# Patient Record
Sex: FEMALE | Race: WHITE | NOT HISPANIC OR LATINO | Employment: UNEMPLOYED | URBAN - METROPOLITAN AREA
[De-identification: names, ages, dates, MRNs, and addresses within clinical notes are randomized per-mention and may not be internally consistent; named-entity substitution may affect disease eponyms.]

---

## 2021-08-27 ENCOUNTER — TELEPHONE (OUTPATIENT)
Dept: OBSTETRICS AND GYNECOLOGY | Facility: CLINIC | Age: 21
End: 2021-08-27

## 2021-08-27 NOTE — TELEPHONE ENCOUNTER
Called [facility] and informed  1/15/20.   Dre Rojas,  Potential patient calling. Has just moved to the area. Is currently 37 weeks pregnant with an SHABANA of 9/14/21. Patient stated that she would be able to have all prenatal records faxed to us. Would you be willing to take the remainder of her prenatal care?  Thank you,  Shantell

## 2021-08-31 ENCOUNTER — INITIAL PRENATAL (OUTPATIENT)
Dept: OBSTETRICS AND GYNECOLOGY | Facility: CLINIC | Age: 21
End: 2021-08-31

## 2021-08-31 ENCOUNTER — TELEPHONE (OUTPATIENT)
Dept: OBSTETRICS AND GYNECOLOGY | Facility: CLINIC | Age: 21
End: 2021-08-31

## 2021-08-31 VITALS
BODY MASS INDEX: 24.43 KG/M2 | WEIGHT: 152 LBS | HEIGHT: 66 IN | DIASTOLIC BLOOD PRESSURE: 69 MMHG | SYSTOLIC BLOOD PRESSURE: 100 MMHG

## 2021-08-31 DIAGNOSIS — Z34.03 ENCOUNTER FOR SUPERVISION OF NORMAL FIRST PREGNANCY IN THIRD TRIMESTER: Primary | ICD-10-CM

## 2021-08-31 LAB
EXTERNAL ABO GROUPING: NORMAL
EXTERNAL ANTIBODY SCREEN: NORMAL
EXTERNAL CYSTIC FIBROSIS: NEGATIVE
EXTERNAL HEMATOCRIT: 36 %
EXTERNAL HEMOGLOBIN: 12.4 G/DL
EXTERNAL HEPATITIS B SURFACE ANTIGEN: NEGATIVE
EXTERNAL RH FACTOR: POSITIVE
EXTERNAL SYPHILIS RPR SCREEN: NORMAL
GLUCOSE 1H P 100 G GLC PO SERPL-MCNC: 135 MG/DL
GLUCOSE UR STRIP-MCNC: NEGATIVE MG/DL
GP B STREP RRNA SPEC QL PROBE: NEGATIVE
HIV 1+2 AB+HIV1 P24 AG SERPL QL IA: NORMAL
PROT UR STRIP-MCNC: ABNORMAL MG/DL
RUBV IGG SERPL IA-ACNC: NORMAL

## 2021-08-31 PROCEDURE — 99203 OFFICE O/P NEW LOW 30 MIN: CPT | Performed by: OBSTETRICS & GYNECOLOGY

## 2021-08-31 RX ORDER — SWAB
SWAB, NON-MEDICATED MISCELLANEOUS
COMMUNITY
End: 2022-09-02

## 2021-08-31 NOTE — PROGRESS NOTES
Initial ob visit     CC- Here for care of pregnancy     Harleen Purdy is being seen today for her first obstetrical visit.  She is a 21 y.o.    38w0d gestation.     # 1 - Date: None, Sex: None, Weight: None, GA: None, Delivery: None, Apgar1: None, Apgar5: None, Living: None, Birth Comments: None    Transfer of care from [facility] at 37 weeks  Good initial prenatal care with no issues or concerns     Current obstetric complaints : none   Duration/severity of complaints:   Initial positive test date :  2020    Location : @ home    Prior obstetric issues, potential pregnancy concerns: transfer of care  Family history of genetic issues (includes FOB): none  Prior infections concerning in pregnancy (Rash, fever in last 2 weeks): none  Varicella Hx -negative history  Prior testing for Cystic Fibrosis Carrier or Sickle Cell Trait- negative   Prepregnancy BMI - Body mass index is 24.53 kg/m².  Hx of HSV for patient or partner : No     History reviewed. No pertinent past medical history.    Past Surgical History:   Procedure Laterality Date   • LEG SURGERY           Current Outpatient Medications:   •  Prenatal 28-0.8 MG tablet, Take  by mouth., Disp: , Rfl:     No Known Allergies    Social History     Socioeconomic History   • Marital status: Single     Spouse name: Not on file   • Number of children: Not on file   • Years of education: Not on file   • Highest education level: Not on file   Tobacco Use   • Smoking status: Former Smoker     Types: Cigarettes   • Smokeless tobacco: Never Used   Substance and Sexual Activity   • Alcohol use: Not Currently   • Drug use: Never   • Sexual activity: Yes     Partners: Male       Family History   Problem Relation Age of Onset   • No Known Problems Father    • No Known Problems Mother    • Breast cancer Neg Hx    • Ovarian cancer Neg Hx    • Uterine cancer Neg Hx    • Colon cancer Neg Hx    • Deep vein thrombosis Neg Hx    • Pulmonary embolism Neg Hx        Review  "of systems     Constitutional : Nausea, fatigue no nausea, fatigue none    : Vaginal bleeding, cramping no bleeding, No cramping   Breast Tenderness : none  All other systems reviewed and negative        Objective    /69   Ht 167.6 cm (66\")   Wt 68.9 kg (152 lb)   LMP 12/08/2020 (Exact Date)   BMI 24.53 kg/m²       General Appearance:    Alert, cooperative, in no acute distress, habitus normal    Head:    Normocephalic, without obvious abnormality, atraumatic   Eyes:            Lids and lashes normal, conjunctivae and sclerae normal, no   icterus, no pallor, corneas clear   Ears:    Ears appear intact with no abnormalities noted       Neck:   No adenopathy, supple, trachea midline, no thyromegaly   Back:     No kyphosis present, no scoliosis present,                       Lungs:     Clear to auscultation,respirations regular, even and     unlabored    Heart:    Regular rhythm and normal rate, normal S1 and S2, no            murmur, no gallop, no rub, no click   Breast Exam:    Deferred    Abdomen:     Normal bowel sounds, no masses, no organomegaly, soft        non-tender, non-distended, no guarding, no rebound                 tenderness   Genitalia:    Vulva - BUS-WNL, NEFG    Vagina - No discharge, No bleeding    Cervix - No Lesions, closed     Uterus - Consistent with 33 weeks, +FHTs    Adnexa - No mass, NT    Pelvimetry - clinically adequate, gynecoid pelvis     Extremities:   Moves all extremities well, no edema, no cyanosis, no              redness   Pulses:   Pulses palpable and equal bilaterally   Skin:   No bleeding, bruising or rash   Lymph nodes:   No palpable adenopathy   Neurologic:   Sensation intact, A&O times 3      Assessment & Plan     There are no diagnoses linked to this encounter.    1) Pregnancy at 38w0d  Records reviewed- have full records including labs, ultrasounds and visits.   Appears to be largely uncomplicated care   Infant AGA on recent ultrasound, has done all milestone " testing including, GBS negative   2) transfer of care  Moved to [facility].     Reviewed labor warnings and FMC at term           Activity recommendation : 150 minutes/week of moderate intensity aerobic activity unless we limit for bleeding, hypertension or other pregnancy complication   Patient is on Prenatal vitamins  Problem list reviewed and updated.  Reviewed routine prenatal care with the office to include but not limited to   Zika (travel restrictions/ok to use insect repellant), not to changing cat litter, food restrictions, avoidance of alcohol, tobacco and drugs and saunas/hot tubs.   All questions answered.     Ray Rojas MD   8/31/2021  10:14 EDT

## 2021-08-31 NOTE — TELEPHONE ENCOUNTER
Bailey,     Records for 38 week new ob today (moved from outside area). Good care.     Thanks,   Dr. Rojas

## 2021-09-07 ENCOUNTER — ROUTINE PRENATAL (OUTPATIENT)
Dept: OBSTETRICS AND GYNECOLOGY | Facility: CLINIC | Age: 21
End: 2021-09-07

## 2021-09-07 VITALS — DIASTOLIC BLOOD PRESSURE: 72 MMHG | SYSTOLIC BLOOD PRESSURE: 106 MMHG | WEIGHT: 154 LBS | BODY MASS INDEX: 24.86 KG/M2

## 2021-09-07 DIAGNOSIS — Z34.03 ENCOUNTER FOR SUPERVISION OF NORMAL FIRST PREGNANCY IN THIRD TRIMESTER: Primary | ICD-10-CM

## 2021-09-07 LAB
GLUCOSE UR STRIP-MCNC: NEGATIVE MG/DL
PROT UR STRIP-MCNC: ABNORMAL MG/DL

## 2021-09-07 PROCEDURE — 99212 OFFICE O/P EST SF 10 MIN: CPT | Performed by: OBSTETRICS & GYNECOLOGY

## 2021-09-07 NOTE — PROGRESS NOTES
Ob follow up    Harleen Purdy is a 21 y.o.  39w0d patient being seen today for her obstetrical visit. Patient reports no complaints. Fetal movement: normal.    Her prenatal care is complicated by (and status) : uncomplicated       ROS -   Fetal Movement good   Vaginal bleeding none   Cramping/Contractions intermittent none      /72   Wt 69.9 kg (154 lb)   LMP 2020 (Exact Date)   BMI 24.86 kg/m²     FHT:  144 BPM    Uterine Size: 32 cm   Presentations: cephalic   Pelvic Exam:     Dilation: 1cm    Effacement: 50%    Station:  -1                 Assessment    Diagnoses and all orders for this visit:    1. Encounter for supervision of normal first pregnancy in third trimester (Primary)        1) Pregnancy at 39w0d  2) Fetal status reassuring   3) GBS status - negative     Declines induction for now.   BPP next visit as will be at due date     Plan    Labor warnings   [facility] BID        Ray Rojas MD   2021  09:38 EDT

## 2021-09-09 ENCOUNTER — HOSPITAL ENCOUNTER (EMERGENCY)
Facility: HOSPITAL | Age: 21
End: 2021-09-09

## 2021-09-09 ENCOUNTER — HOSPITAL ENCOUNTER (INPATIENT)
Facility: HOSPITAL | Age: 21
LOS: 2 days | Discharge: HOME | End: 2021-09-12
Attending: OBSTETRICS & GYNECOLOGY | Admitting: OBSTETRICS & GYNECOLOGY

## 2021-09-09 PROCEDURE — 99202 OFFICE O/P NEW SF 15 MIN: CPT | Performed by: OBSTETRICS & GYNECOLOGY

## 2021-09-10 ENCOUNTER — ANESTHESIA EVENT (OUTPATIENT)
Dept: LABOR AND DELIVERY | Facility: HOSPITAL | Age: 21
End: 2021-09-10

## 2021-09-10 ENCOUNTER — ANESTHESIA (OUTPATIENT)
Dept: LABOR AND DELIVERY | Facility: HOSPITAL | Age: 21
End: 2021-09-10

## 2021-09-10 PROBLEM — Z34.90 PREGNANCY: Status: ACTIVE | Noted: 2021-09-10

## 2021-09-10 LAB
ABO GROUP BLD: NORMAL
AMORPH URATE CRY URNS QL MICRO: ABNORMAL /HPF
BACTERIA UR QL AUTO: ABNORMAL /HPF
BILIRUB UR QL STRIP: NEGATIVE
BLD GP AB SCN SERPL QL: NEGATIVE
CLARITY UR: ABNORMAL
COLOR UR: YELLOW
DEPRECATED RDW RBC AUTO: 40.9 FL (ref 37–54)
ERYTHROCYTE [DISTWIDTH] IN BLOOD BY AUTOMATED COUNT: 13.1 % (ref 12.3–15.4)
GLUCOSE UR STRIP-MCNC: NEGATIVE MG/DL
HCT VFR BLD AUTO: 33 % (ref 34–46.6)
HGB BLD-MCNC: 11.2 G/DL (ref 12–15.9)
HGB UR QL STRIP.AUTO: ABNORMAL
HYALINE CASTS UR QL AUTO: ABNORMAL /LPF
KETONES UR QL STRIP: NEGATIVE
LEUKOCYTE ESTERASE UR QL STRIP.AUTO: ABNORMAL
MCH RBC QN AUTO: 29.1 PG (ref 26.6–33)
MCHC RBC AUTO-ENTMCNC: 33.9 G/DL (ref 31.5–35.7)
MCV RBC AUTO: 85.7 FL (ref 79–97)
NITRITE UR QL STRIP: NEGATIVE
PH UR STRIP.AUTO: 7 [PH] (ref 5–8)
PLATELET # BLD AUTO: 229 10*3/MM3 (ref 140–450)
PMV BLD AUTO: 10.3 FL (ref 6–12)
PROT UR QL STRIP: ABNORMAL
RBC # BLD AUTO: 3.85 10*6/MM3 (ref 3.77–5.28)
RBC # UR: ABNORMAL /HPF
REF LAB TEST METHOD: ABNORMAL
RH BLD: POSITIVE
SARS-COV-2 RNA RESP QL NAA+PROBE: NOT DETECTED
SP GR UR STRIP: 1.01 (ref 1–1.03)
SQUAMOUS #/AREA URNS HPF: ABNORMAL /HPF
T&S EXPIRATION DATE: NORMAL
UROBILINOGEN UR QL STRIP: ABNORMAL
WBC # BLD AUTO: 12.06 10*3/MM3 (ref 3.4–10.8)
WBC UR QL AUTO: ABNORMAL /HPF

## 2021-09-10 PROCEDURE — 25010000002 FENTANYL CITRATE (PF) 2500 MCG/50ML SOLUTION: Performed by: ANESTHESIOLOGY

## 2021-09-10 PROCEDURE — C1755 CATHETER, INTRASPINAL: HCPCS

## 2021-09-10 PROCEDURE — 25010000002 ROPIVACAINE PER 1 MG: Performed by: ANESTHESIOLOGY

## 2021-09-10 PROCEDURE — C1755 CATHETER, INTRASPINAL: HCPCS | Performed by: ANESTHESIOLOGY

## 2021-09-10 PROCEDURE — 85027 COMPLETE CBC AUTOMATED: CPT | Performed by: OBSTETRICS & GYNECOLOGY

## 2021-09-10 PROCEDURE — U0003 INFECTIOUS AGENT DETECTION BY NUCLEIC ACID (DNA OR RNA); SEVERE ACUTE RESPIRATORY SYNDROME CORONAVIRUS 2 (SARS-COV-2) (CORONAVIRUS DISEASE [COVID-19]), AMPLIFIED PROBE TECHNIQUE, MAKING USE OF HIGH THROUGHPUT TECHNOLOGIES AS DESCRIBED BY CMS-2020-01-R: HCPCS | Performed by: OBSTETRICS & GYNECOLOGY

## 2021-09-10 PROCEDURE — 87086 URINE CULTURE/COLONY COUNT: CPT | Performed by: OBSTETRICS & GYNECOLOGY

## 2021-09-10 PROCEDURE — 86850 RBC ANTIBODY SCREEN: CPT | Performed by: OBSTETRICS & GYNECOLOGY

## 2021-09-10 PROCEDURE — 81001 URINALYSIS AUTO W/SCOPE: CPT | Performed by: OBSTETRICS & GYNECOLOGY

## 2021-09-10 PROCEDURE — 86901 BLOOD TYPING SEROLOGIC RH(D): CPT | Performed by: OBSTETRICS & GYNECOLOGY

## 2021-09-10 PROCEDURE — 86900 BLOOD TYPING SEROLOGIC ABO: CPT | Performed by: OBSTETRICS & GYNECOLOGY

## 2021-09-10 PROCEDURE — 0HQ9XZZ REPAIR PERINEUM SKIN, EXTERNAL APPROACH: ICD-10-PCS | Performed by: OBSTETRICS & GYNECOLOGY

## 2021-09-10 PROCEDURE — 25010000002 TERBUTALINE PER 1 MG: Performed by: OBSTETRICS & GYNECOLOGY

## 2021-09-10 PROCEDURE — 59410 OBSTETRICAL CARE: CPT | Performed by: OBSTETRICS & GYNECOLOGY

## 2021-09-10 RX ORDER — FAMOTIDINE 10 MG/ML
20 INJECTION, SOLUTION INTRAVENOUS ONCE AS NEEDED
Status: DISCONTINUED | OUTPATIENT
Start: 2021-09-10 | End: 2021-09-10 | Stop reason: HOSPADM

## 2021-09-10 RX ORDER — TERBUTALINE SULFATE 1 MG/ML
0.25 INJECTION, SOLUTION SUBCUTANEOUS ONCE
Status: COMPLETED | OUTPATIENT
Start: 2021-09-10 | End: 2021-09-10

## 2021-09-10 RX ORDER — ACETAMINOPHEN 325 MG/1
650 TABLET ORAL EVERY 4 HOURS PRN
Status: DISCONTINUED | OUTPATIENT
Start: 2021-09-10 | End: 2021-09-10 | Stop reason: HOSPADM

## 2021-09-10 RX ORDER — ONDANSETRON 2 MG/ML
4 INJECTION INTRAMUSCULAR; INTRAVENOUS EVERY 6 HOURS PRN
Status: DISCONTINUED | OUTPATIENT
Start: 2021-09-10 | End: 2021-09-10 | Stop reason: HOSPADM

## 2021-09-10 RX ORDER — ONDANSETRON 2 MG/ML
4 INJECTION INTRAMUSCULAR; INTRAVENOUS ONCE AS NEEDED
Status: DISCONTINUED | OUTPATIENT
Start: 2021-09-10 | End: 2021-09-10 | Stop reason: HOSPADM

## 2021-09-10 RX ORDER — MAGNESIUM CARB/ALUMINUM HYDROX 105-160MG
30 TABLET,CHEWABLE ORAL ONCE
Status: DISCONTINUED | OUTPATIENT
Start: 2021-09-10 | End: 2021-09-10 | Stop reason: HOSPADM

## 2021-09-10 RX ORDER — IBUPROFEN 800 MG/1
800 TABLET ORAL EVERY 8 HOURS PRN
Status: DISCONTINUED | OUTPATIENT
Start: 2021-09-10 | End: 2021-09-12 | Stop reason: HOSPADM

## 2021-09-10 RX ORDER — MISOPROSTOL 200 UG/1
800 TABLET ORAL AS NEEDED
Status: DISCONTINUED | OUTPATIENT
Start: 2021-09-10 | End: 2021-09-12 | Stop reason: HOSPADM

## 2021-09-10 RX ORDER — EPHEDRINE SULFATE 50 MG/ML
5 INJECTION, SOLUTION INTRAVENOUS AS NEEDED
Status: DISCONTINUED | OUTPATIENT
Start: 2021-09-10 | End: 2021-09-10 | Stop reason: HOSPADM

## 2021-09-10 RX ORDER — CARBOPROST TROMETHAMINE 250 UG/ML
250 INJECTION, SOLUTION INTRAMUSCULAR
Status: DISCONTINUED | OUTPATIENT
Start: 2021-09-10 | End: 2021-09-10 | Stop reason: HOSPADM

## 2021-09-10 RX ORDER — FAMOTIDINE 20 MG/1
20 TABLET, FILM COATED ORAL EVERY 12 HOURS SCHEDULED
Status: DISCONTINUED | OUTPATIENT
Start: 2021-09-10 | End: 2021-09-10 | Stop reason: HOSPADM

## 2021-09-10 RX ORDER — SODIUM CHLORIDE 0.9 % (FLUSH) 0.9 %
10 SYRINGE (ML) INJECTION AS NEEDED
Status: DISCONTINUED | OUTPATIENT
Start: 2021-09-10 | End: 2021-09-10 | Stop reason: HOSPADM

## 2021-09-10 RX ORDER — OXYTOCIN-SODIUM CHLORIDE 0.9% IV SOLN 30 UNIT/500ML 30-0.9/5 UT/ML-%
250 SOLUTION INTRAVENOUS CONTINUOUS PRN
Status: ACTIVE | OUTPATIENT
Start: 2021-09-10 | End: 2021-09-10

## 2021-09-10 RX ORDER — SODIUM CHLORIDE 0.9 % (FLUSH) 0.9 %
1-10 SYRINGE (ML) INJECTION AS NEEDED
Status: DISCONTINUED | OUTPATIENT
Start: 2021-09-10 | End: 2021-09-12 | Stop reason: HOSPADM

## 2021-09-10 RX ORDER — PHYTONADIONE 1 MG/.5ML
INJECTION, EMULSION INTRAMUSCULAR; INTRAVENOUS; SUBCUTANEOUS
Status: ACTIVE
Start: 2021-09-10 | End: 2021-09-11

## 2021-09-10 RX ORDER — OXYTOCIN-SODIUM CHLORIDE 0.9% IV SOLN 30 UNIT/500ML 30-0.9/5 UT/ML-%
125 SOLUTION INTRAVENOUS CONTINUOUS PRN
Status: COMPLETED | OUTPATIENT
Start: 2021-09-10 | End: 2021-09-10

## 2021-09-10 RX ORDER — PRENATAL VIT/IRON FUM/FOLIC AC 27MG-0.8MG
1 TABLET ORAL DAILY
Status: DISCONTINUED | OUTPATIENT
Start: 2021-09-10 | End: 2021-09-12 | Stop reason: HOSPADM

## 2021-09-10 RX ORDER — HYDROCORTISONE 25 MG/G
1 CREAM TOPICAL AS NEEDED
Status: DISCONTINUED | OUTPATIENT
Start: 2021-09-10 | End: 2021-09-12 | Stop reason: HOSPADM

## 2021-09-10 RX ORDER — TERBUTALINE SULFATE 1 MG/ML
0.25 INJECTION, SOLUTION SUBCUTANEOUS ONCE
Status: DISCONTINUED | OUTPATIENT
Start: 2021-09-10 | End: 2021-09-10

## 2021-09-10 RX ORDER — OXYTOCIN-SODIUM CHLORIDE 0.9% IV SOLN 30 UNIT/500ML 30-0.9/5 UT/ML-%
2-20 SOLUTION INTRAVENOUS
Status: DISCONTINUED | OUTPATIENT
Start: 2021-09-10 | End: 2021-09-10 | Stop reason: HOSPADM

## 2021-09-10 RX ORDER — PROMETHAZINE HYDROCHLORIDE 12.5 MG/1
12.5 TABLET ORAL EVERY 4 HOURS PRN
Status: DISCONTINUED | OUTPATIENT
Start: 2021-09-10 | End: 2021-09-12 | Stop reason: HOSPADM

## 2021-09-10 RX ORDER — OXYTOCIN-SODIUM CHLORIDE 0.9% IV SOLN 30 UNIT/500ML 30-0.9/5 UT/ML-%
999 SOLUTION INTRAVENOUS ONCE
Status: COMPLETED | OUTPATIENT
Start: 2021-09-10 | End: 2021-09-10

## 2021-09-10 RX ORDER — MISOPROSTOL 200 UG/1
800 TABLET ORAL ONCE AS NEEDED
Status: COMPLETED | OUTPATIENT
Start: 2021-09-10 | End: 2021-09-10

## 2021-09-10 RX ORDER — ONDANSETRON 4 MG/1
4 TABLET, FILM COATED ORAL EVERY 6 HOURS PRN
Status: DISCONTINUED | OUTPATIENT
Start: 2021-09-10 | End: 2021-09-10 | Stop reason: HOSPADM

## 2021-09-10 RX ORDER — DIPHENHYDRAMINE HYDROCHLORIDE 50 MG/ML
12.5 INJECTION INTRAMUSCULAR; INTRAVENOUS EVERY 8 HOURS PRN
Status: DISCONTINUED | OUTPATIENT
Start: 2021-09-10 | End: 2021-09-10 | Stop reason: HOSPADM

## 2021-09-10 RX ORDER — SODIUM CHLORIDE 0.9 % (FLUSH) 0.9 %
10 SYRINGE (ML) INJECTION EVERY 12 HOURS SCHEDULED
Status: DISCONTINUED | OUTPATIENT
Start: 2021-09-10 | End: 2021-09-10 | Stop reason: HOSPADM

## 2021-09-10 RX ORDER — BISACODYL 10 MG
10 SUPPOSITORY, RECTAL RECTAL DAILY PRN
Status: DISCONTINUED | OUTPATIENT
Start: 2021-09-11 | End: 2021-09-12 | Stop reason: HOSPADM

## 2021-09-10 RX ORDER — SODIUM CHLORIDE, SODIUM LACTATE, POTASSIUM CHLORIDE, CALCIUM CHLORIDE 600; 310; 30; 20 MG/100ML; MG/100ML; MG/100ML; MG/100ML
125 INJECTION, SOLUTION INTRAVENOUS CONTINUOUS
Status: DISCONTINUED | OUTPATIENT
Start: 2021-09-10 | End: 2021-09-10

## 2021-09-10 RX ORDER — HYDROCODONE BITARTRATE AND ACETAMINOPHEN 5; 325 MG/1; MG/1
1 TABLET ORAL EVERY 4 HOURS PRN
Status: DISCONTINUED | OUTPATIENT
Start: 2021-09-10 | End: 2021-09-12 | Stop reason: HOSPADM

## 2021-09-10 RX ORDER — LIDOCAINE HYDROCHLORIDE 10 MG/ML
5 INJECTION, SOLUTION EPIDURAL; INFILTRATION; INTRACAUDAL; PERINEURAL AS NEEDED
Status: DISCONTINUED | OUTPATIENT
Start: 2021-09-10 | End: 2021-09-10 | Stop reason: HOSPADM

## 2021-09-10 RX ORDER — HYDROXYZINE 50 MG/1
50 TABLET, FILM COATED ORAL NIGHTLY PRN
Status: DISCONTINUED | OUTPATIENT
Start: 2021-09-10 | End: 2021-09-12 | Stop reason: HOSPADM

## 2021-09-10 RX ORDER — DOCUSATE SODIUM 100 MG/1
100 CAPSULE, LIQUID FILLED ORAL 2 TIMES DAILY
Status: DISCONTINUED | OUTPATIENT
Start: 2021-09-10 | End: 2021-09-12 | Stop reason: HOSPADM

## 2021-09-10 RX ORDER — ERYTHROMYCIN 5 MG/G
OINTMENT OPHTHALMIC
Status: ACTIVE
Start: 2021-09-10 | End: 2021-09-11

## 2021-09-10 RX ORDER — METHYLERGONOVINE MALEATE 0.2 MG/ML
200 INJECTION INTRAVENOUS ONCE AS NEEDED
Status: DISCONTINUED | OUTPATIENT
Start: 2021-09-10 | End: 2021-09-10 | Stop reason: HOSPADM

## 2021-09-10 RX ORDER — TRANEXAMIC ACID 100 MG/ML
INJECTION, SOLUTION INTRAVENOUS
Status: COMPLETED
Start: 2021-09-10 | End: 2021-09-10

## 2021-09-10 RX ORDER — TRANEXAMIC ACID 100 MG/ML
1000 INJECTION, SOLUTION INTRAVENOUS ONCE
Status: COMPLETED | OUTPATIENT
Start: 2021-09-10 | End: 2021-09-10

## 2021-09-10 RX ORDER — FAMOTIDINE 10 MG/ML
20 INJECTION, SOLUTION INTRAVENOUS EVERY 12 HOURS SCHEDULED
Status: DISCONTINUED | OUTPATIENT
Start: 2021-09-10 | End: 2021-09-10 | Stop reason: HOSPADM

## 2021-09-10 RX ADMIN — EPHEDRINE SULFATE 5 MG: 50 INJECTION INTRAVENOUS at 07:49

## 2021-09-10 RX ADMIN — ROPIVACAINE HYDROCHLORIDE: 2 INJECTION, SOLUTION EPIDURAL; INFILTRATION at 12:13

## 2021-09-10 RX ADMIN — EPHEDRINE SULFATE 5 MG: 50 INJECTION INTRAVENOUS at 07:02

## 2021-09-10 RX ADMIN — EPHEDRINE SULFATE 5 MG: 50 INJECTION INTRAVENOUS at 06:43

## 2021-09-10 RX ADMIN — Medication 1 TABLET: at 20:11

## 2021-09-10 RX ADMIN — TRANEXAMIC ACID 1000 MG: 100 INJECTION, SOLUTION INTRAVENOUS at 13:07

## 2021-09-10 RX ADMIN — OXYTOCIN 999 ML/HR: 10 INJECTION INTRAVENOUS at 13:01

## 2021-09-10 RX ADMIN — OXYTOCIN 2 MILLI-UNITS/MIN: 10 INJECTION INTRAVENOUS at 03:47

## 2021-09-10 RX ADMIN — IBUPROFEN 800 MG: 800 TABLET, FILM COATED ORAL at 16:57

## 2021-09-10 RX ADMIN — Medication 10 ML: at 01:20

## 2021-09-10 RX ADMIN — DOCUSATE SODIUM 100 MG: 100 CAPSULE, LIQUID FILLED ORAL at 20:11

## 2021-09-10 RX ADMIN — SODIUM CHLORIDE, POTASSIUM CHLORIDE, SODIUM LACTATE AND CALCIUM CHLORIDE 125 ML/HR: 600; 310; 30; 20 INJECTION, SOLUTION INTRAVENOUS at 05:34

## 2021-09-10 RX ADMIN — SODIUM CHLORIDE 500 ML: 9 INJECTION, SOLUTION INTRAVENOUS at 09:46

## 2021-09-10 RX ADMIN — Medication: at 20:10

## 2021-09-10 RX ADMIN — SODIUM CHLORIDE, POTASSIUM CHLORIDE, SODIUM LACTATE AND CALCIUM CHLORIDE 125 ML/HR: 600; 310; 30; 20 INJECTION, SOLUTION INTRAVENOUS at 03:41

## 2021-09-10 RX ADMIN — ACETAMINOPHEN 650 MG: 325 TABLET, FILM COATED ORAL at 14:27

## 2021-09-10 RX ADMIN — ROPIVACAINE HYDROCHLORIDE 10 ML/HR: 2 INJECTION, SOLUTION EPIDURAL; INFILTRATION at 04:57

## 2021-09-10 RX ADMIN — MISOPROSTOL 800 MCG: 200 TABLET ORAL at 13:08

## 2021-09-10 RX ADMIN — SODIUM CHLORIDE, POTASSIUM CHLORIDE, SODIUM LACTATE AND CALCIUM CHLORIDE 125 ML/HR: 600; 310; 30; 20 INJECTION, SOLUTION INTRAVENOUS at 08:52

## 2021-09-10 RX ADMIN — TERBUTALINE SULFATE 0.25 MG: 1 INJECTION SUBCUTANEOUS at 08:40

## 2021-09-10 RX ADMIN — OXYTOCIN 125 ML/HR: 10 INJECTION INTRAVENOUS at 14:14

## 2021-09-10 NOTE — ANESTHESIA PROCEDURE NOTES
Epidural Block      Patient reassessed immediately prior to procedure    Patient location during procedure: OB  Indication:at surgeon's request  Performed By  Anesthesiologist: Home Oshea MD  Preanesthetic Checklist  Completed: patient identified, IV checked, site marked, risks and benefits discussed, surgical consent, monitors and equipment checked, pre-op evaluation and timeout performed  Prep:  Pt Position:sitting  Sterile Tech:cap, gloves, mask and sterile barrier  Prep:chlorhexidine gluconate and isopropyl alcohol  Monitoring:blood pressure monitoring, continuous pulse oximetry and EKG  Epidural Block Procedure:  Approach:midline  Guidance:landmark technique and palpation technique  Location:lumbar  Level:4-5  Needle Type:Tuohy  Needle Gauge:17  Cath Size: 19 G  Loss of Resistance Medium: saline  Paresthesia: none  Aspiration:negative  Test Dose:negative  Post Assessment:  Dressing:occlusive dressing applied and secured with tape  Pt Tolerance:patient tolerated the procedure well with no apparent complications  Complications:no

## 2021-09-10 NOTE — PLAN OF CARE
Goal Outcome Evaluation:  Plan of Care Reviewed With: patient        Progress: improving  Outcome Summary: Admit for SROM. VSS. Pitocin for augmentation. Pain controlled with epidural. Plans to breastfeed.

## 2021-09-10 NOTE — H&P
[facility]  Obstetric History and Physical    Chief Complaint   Patient presents with   • Rupture of Membranes     JUDY. Noticed trickle of fluid around 2200. +FM. Denies vaginal bleeding. Occasional CTX           Patient is a 21 y.o. female  currently at 39w3d, who presents with amniorrhexis/labor.    Her prenatal care was with Dr. Rojas and was complicated by transfer in at term (Moved from [place], [place]).        External Prenatal Results     Pregnancy Outside Results - Transcribed From Office Records - See Scanned Records For Details     Test Value Date Time    ABO  O  09/10/21 012    Rh  Positive  09/10/21 0121    Antibody Screen  Negative  09/10/21 012      ^ Normal  21     Varicella IgG       Rubella ^ immune  21     Hgb  11.2 g/dL 09/10/21 0121      ^ 12.4 g/dL 21     Hct  33.0 % 09/10/21 0121      ^ 36 % 21     Glucose Fasting GTT       Glucose Tolerance Test 1 hour ^ 135  21     Glucose Tolerance Test 3 hour       Gonorrhea (discrete)       Chlamydia (discrete)       RPR ^ Non-Reactive  21     VDRL       Syphilis Antibody       HBsAg ^ Negative  21     Herpes Simplex Virus PCR       Herpes Simplex VIrus Culture       HIV ^ neg  21     Hep C RNA Quant PCR       Hep C Antibody       AFP       Group B Strep ^ negative  21     GBS Susceptibility to Clindamycin       GBS Susceptibility to Erythromycin       Fetal Fibronectin       Genetic Testing, Maternal Blood             Drug Screening     Test Value Date Time    Urine Drug Screen       Amphetamine Screen       Barbiturate Screen       Benzodiazepine Screen       Methadone Screen       Phencyclidine Screen       Opiates Screen       THC Screen       Cocaine Screen       Propoxyphene Screen       Buprenorphine Screen       Methamphetamine Screen       Oxycodone Screen       Tricyclic Antidepressants Screen             Legend    ^: Historical                           OB History    Para  Term  AB Living   1 0 0 0 0 0   SAB TAB Ectopic Molar Multiple Live Births   0 0 0 0 0 0      # Outcome Date GA Lbr Lorenzo/2nd Weight Sex Delivery Anes PTL Lv   1 Current                  History reviewed. No pertinent past medical history.       Past Surgical History:   Procedure Laterality Date   • LEG SURGERY            No current facility-administered medications on file prior to encounter.     Current Outpatient Medications on File Prior to Encounter   Medication Sig Dispense Refill   • Prenatal 28-0.8 MG tablet Take  by mouth.          No Known Allergies       Social History     Socioeconomic History   • Marital status: Single     Spouse name: Not on file   • Number of children: Not on file   • Years of education: Not on file   • Highest education level: Not on file   Tobacco Use   • Smoking status: Former Smoker     Types: Cigarettes   • Smokeless tobacco: Never Used   Vaping Use   • Vaping Use: Never used   Substance and Sexual Activity   • Alcohol use: Not Currently   • Drug use: Never   • Sexual activity: Yes     Partners: Male          Family History   Problem Relation Age of Onset   • No Known Problems Father    • No Known Problems Mother    • Breast cancer Neg Hx    • Ovarian cancer Neg Hx    • Uterine cancer Neg Hx    • Colon cancer Neg Hx    • Deep vein thrombosis Neg Hx    • Pulmonary embolism Neg Hx         Review of Systems   Constitutional: Negative for chills and fever.   Eyes: Negative for visual disturbance.   Gastrointestinal: Negative for abdominal pain, constipation and diarrhea.   Genitourinary: Positive for pelvic pain and vaginal discharge. Negative for vaginal bleeding.   Neurological: Negative for headache.   All other systems reviewed and are negative.      Temp:  [97.5 °F (36.4 °C)-98.8 °F (37.1 °C)] 97.8 °F (36.6 °C)  Heart Rate:  [] 96  Resp:  [16-18] 18  BP: ()/(38-84) 91/49      Physical Exam  Vitals reviewed. Exam conducted with a chaperone present.    Constitutional:       General: She is not in acute distress.     Appearance: She is well-developed.   HENT:      Head: Normocephalic and atraumatic.   Eyes:      General:         Right eye: No discharge.         Left eye: No discharge.      Conjunctiva/sclera: Conjunctivae normal.   Neck:      Thyroid: No thyromegaly.   Cardiovascular:      Rate and Rhythm: Normal rate and regular rhythm.      Heart sounds: Normal heart sounds. No murmur heard.     Pulmonary:      Effort: Pulmonary effort is normal. No respiratory distress.      Breath sounds: Normal breath sounds.   Abdominal:      General: There is distension (EFW 7# ).      Palpations: Abdomen is soft.      Tenderness: There is no abdominal tenderness.   Genitourinary:     Labia:         Right: No lesion.         Left: No lesion.       Cervix: No cervical bleeding (Cx 4/80/-2, IUPC placed ).      Uterus: Enlarged (Gravid ).    Musculoskeletal:         General: Normal range of motion.      Cervical back: Normal range of motion and neck supple.      Right lower leg: Edema (trace ) present.      Left lower leg: Edema present.   Lymphadenopathy:      Cervical: No cervical adenopathy.   Skin:     General: Skin is warm and dry.      Findings: No rash.   Neurological:      Mental Status: She is alert and oriented to person, place, and time.      Deep Tendon Reflexes: Reflexes normal.   Psychiatric:         Behavior: Behavior normal.         Thought Content: Thought content normal.         Judgment: Judgment normal.           FHT - Class 2, intermittent to regular variable decelerations with contractions.   [place] - q 3 min     Lab Results   Component Value Date    WBC 12.06 (H) 09/10/2021    HGB 11.2 (L) 09/10/2021    HCT 33.0 (L) 09/10/2021    MCV 85.7 09/10/2021     09/10/2021         Pregnancy      Assessment:  1.  Intrauterine pregnancy at 39w3d weeks gestation with variable decelerations present fetal status.    2.  labor  with ROM  3.  Obstetrical history  uncomplicated  4.  GBS status: .Negative     Plan:  1. Vaginal anticipated, fetal and uterine monitoring  continuously, labor augmentation  Pitocin and analgesia with  epidural  2. Plan of care has been reviewed with patient and significant other  Primary concern is intermittent class II tracing with variable decelerations. Have worked on hypotension and IUPC with amnioinfusion, still appears mostly positional. Working to optimize tracing and allow labor.. Aware if unable to do this could have to proceed with Primary    3.  Risks, benefits of treatment plan have been discussed.  4.  All questions have been answered.        Ray Rojas MD  9/10/2021  11:23 EDT

## 2021-09-10 NOTE — ANESTHESIA PREPROCEDURE EVALUATION
Anesthesia Evaluation     Patient summary reviewed and Nursing notes reviewed                Airway   Mallampati: II  TM distance: >3 FB  Neck ROM: full  Dental - normal exam     Pulmonary - normal exam   (+) a smoker Former,   Cardiovascular - negative cardio ROS and normal exam        Neuro/Psych- negative ROS  GI/Hepatic/Renal/Endo - negative ROS     Musculoskeletal (-) negative ROS    Abdominal    Substance History - negative use     OB/GYN    (+) Pregnant,         Other                        Anesthesia Plan    ASA 2     epidural   (  39w3d)    Anesthetic plan, all risks, benefits, and alternatives have been provided, discussed and informed consent has been obtained with: patient.

## 2021-09-10 NOTE — NURSING NOTE
Dr. Rojas at [facility]. Sve performed, 8-9/100/0. Plan of care discussed with patient.

## 2021-09-10 NOTE — L&D DELIVERY NOTE
[facility]  Vaginal Delivery Note    Delivery    Predelivery Diagnoses: 1) Pregnancy at 39w3d                                                      Postdelivery Diagnoses 1) Pregnancy at 39w3d                                              2) Delayed postpartum hemorrhage                                           Attending :  Ray Rojas MD     Procedure : Obstetrical controlled vaginal delivery    Delivery Narrative :     Harleen Purdy is a 21 y.o. year old  @ 39w3d estimated gestational age. She presented to L&D for amniorrhexis.  Her prenatal care was with Dr Rojas and was uncomplicated, but transferred into the practice at 37 weeks due to moving from [place], [place]. She was admitted, and after a short period of time approximately 3 hours started on Pitocin augmentation.  By the time of my arrival this morning, she had received her epidural anesthesia and the staff called me concerned about some recurrent variable decelerations.  Initially we believe those to be related to hypotension from her epidural, but they initially did not respond to treatment.  Examination showed her to be 3 cm and IUPC was placed.  When she continued to have variable decelerations, amnioinfusion was ordered.  When that did not completely eradicate the decelerations examination showed her to be 6 to 7 cm.  During the next 2 or so hours we would continue to have periods of significant decelerations, but she appeared to also be rapidly laboring shortly after 1230 this afternoon, I checked her and found her to be completely dilated and +1 to +2 station.  Having concerns over her fetal tracing we elected to start pushing and try to deliver, within a short time she was able to she was able to deliver.    Once the decision had been made to start pushing, she was prepped in the lithotomy position, and was doing well in her pushing efforts.  With delivery of the fetal head in OA presentation, bulb suction was performed, then using  a hong type maneuver, pressure was placed along the posterior aspect of the anterior shoulder and it delivered without difficulty. One nuchal cord noted, and reduced after delivery. The infant showed good cry and tone and was placed upon the Mother's abdomen. After a thirty second delay, I then clamped the cord and it was cut.  Care of the infant was then turned over to the delivery team. Cord blood was obtained and then using gentle pressure the placenta was delivered spontaneous/intact and noted to have 3 vessel cord.  At that point I undertook inspection of the perineum and vulva and I repaired bilateral labial lacerations using 3-0 Monocryl in standard fashion with good hemostatic and cosmetic results.       During the evaluation and laceration repair, the patient had an episode of excessive uterine bleeding that was probably 8 to 900 cc pouring out her vagina.  Immediately began uterine exploration with no evidence of retained tissue problems, along with bimanual massage with good return of uterine tone.  The postpartum hemorrhage cart was brought into the room, the patient was given a dose of TXA.  Misoprostol 800 mcg was placed in the rectum and a return to normal tone was noted.  Minimal further bleeding was seen, but decision made to watch the patient closely.    After repair of all lacerations, the area was noted to be hemostatic and all sponge and needle counts were correct. A vaginal exam and rectal exam showed no issues with retained equipment or suture in an abnormal place.      There was one family member(s) noted to be in the room with this patient.            Delivery: Vaginal, Spontaneous     YOB: 2020    Time of Birth: 12:57 PM      Anesthesia: Epidural             EBL: QBL 1193 cc           Infant    Findings: male  infant     Infant observations: Weight: 3426 g (7 lb 8.9 oz)   Length: 20  in  Observations/Comments:  scale 1      Apgars: 8  @ 1 minute /    9  @ 5 minutes        Complications  Postpartum hemorrhage. See note.   TXA and misoprostol able to control with bimanual massage.     Disposition  Mother to Mother Baby/Postpartum  in stable condition currently.  Baby to remains with mom  in stable condition currently.      Ray Rojas MD  09/10/21  15:43 EDT

## 2021-09-10 NOTE — ANESTHESIA POSTPROCEDURE EVALUATION
"Patient: Harleen Purdy    Procedure Summary     Date: 09/10/21 Room / Location:     Anesthesia Start: 0438 Anesthesia Stop: 1257    Procedure: LABOR ANALGESIA Diagnosis:     Scheduled Providers:  Provider: Jaskaran Olivera MD    Anesthesia Type: epidural ASA Status: 2          Anesthesia Type: epidural    Vitals  Vitals Value Taken Time   BP 95/45 09/10/21 1324   Temp 37.5 °C (99.5 °F) 09/10/21 1158   Pulse 137 09/10/21 1324   Resp 16 09/10/21 1158   SpO2 100 % 09/10/21 1259   Vitals shown include unvalidated device data.        Post Anesthesia Care and Evaluation    Patient location during evaluation: bedside  Patient participation: complete - patient participated  Level of consciousness: awake and alert  Pain management: adequate  Airway patency: patent  Anesthetic complications: No anesthetic complications    Cardiovascular status: acceptable  Respiratory status: acceptable  Hydration status: acceptable    Comments: BP (!) 85/47   Pulse (!) 132   Temp 37.5 °C (99.5 °F) (Oral)   Resp 16   Ht 167.6 cm (66\")   Wt 70.8 kg (156 lb)   LMP 12/08/2020 (Exact Date)   SpO2 100%   Breastfeeding Yes   BMI 25.18 kg/m²       "

## 2021-09-10 NOTE — OBED NOTES
JUDY Note OB    Patient Name: Harleen Purdy  YOB: 2000  MRN: [id number]  Admission Date: 2021 11:35 PM  Date of Service: 9/10/2021    Chief Complaint: Rupture of Membranes (JUDY. Noticed trickle of fluid around 2200. +FM. Denies vaginal bleeding. Occasional CTX)        Subjective     Harleen Purdy is a 21 y.o. female  at 39w3d with Estimated Date of Delivery: 21 who presents with the chief complaint listed above. Pt was transfer of care at 38 weeks to Dr Rojas but no problems thru [facility]. She desires epidural when needed.     She sees Ray Rojas MD for her prenatal care. Her pregnancy has been complicated by:  denies    She describes fetal movement as normal.  She admits to rupture of membranes.  She denies vaginal bleeding. She is feeling contractions.        Objective   There are no problems to display for this patient.       OB History    Para Term  AB Living   1 0 0 0 0 0   SAB TAB Ectopic Molar Multiple Live Births   0 0 0 0 0 0      # Outcome Date GA Lbr Lorenzo/2nd Weight Sex Delivery Anes PTL Lv   1 Current                 History reviewed. No pertinent past medical history.    Past Surgical History:   Procedure Laterality Date   • LEG SURGERY         No current facility-administered medications on file prior to encounter.     Current Outpatient Medications on File Prior to Encounter   Medication Sig Dispense Refill   • Prenatal 28-0.8 MG tablet Take  by mouth.         No Known Allergies    Family History   Problem Relation Age of Onset   • No Known Problems Father    • No Known Problems Mother    • Breast cancer Neg Hx    • Ovarian cancer Neg Hx    • Uterine cancer Neg Hx    • Colon cancer Neg Hx    • Deep vein thrombosis Neg Hx    • Pulmonary embolism Neg Hx        Social History     Socioeconomic History   • Marital status: Single     Spouse name: Not on file   • Number of children: Not on file   • Years of education: Not on file   • Highest education  "level: Not on file   Tobacco Use   • Smoking status: Former Smoker     Types: Cigarettes   • Smokeless tobacco: Never Used   Vaping Use   • Vaping Use: Never used   Substance and Sexual Activity   • Alcohol use: Not Currently   • Drug use: Never   • Sexual activity: Yes     Partners: Male           Review of Systems   Constitutional: Negative for chills and fever.   HENT: Negative.    Eyes: Negative for photophobia and visual disturbance.   Respiratory: Negative for shortness of breath.    Cardiovascular: Negative for chest pain.   Gastrointestinal: Positive for abdominal pain. Negative for nausea.   Genitourinary: Positive for vaginal discharge.   Psychiatric/Behavioral: The patient is not nervous/anxious.           PHYSICAL EXAM:      VITAL SIGNS:  Vitals:    09/10/21 0002   BP: 111/70   BP Location: Right arm   Patient Position: Lying   Pulse: 100   Resp: 16   Temp: 98.8 °F (37.1 °C)   TempSrc: Oral   SpO2: 100%   Weight: 70.8 kg (156 lb)   Height: 167.6 cm (66\")            FHT'S:    130 with moderate variability and sccels                                     PHYSICAL EXAM:      General: well developed; well nourished  no acute distress   Heart: Not performed.   Lungs   breathing is unlabored   Abdomen: Gravid and non tender     Extremities: trace edema, DTRs 1 plus, no clonus       Cervix: Per RN, grossly ruptured  Cervical Dilation (cm): 1-2  Cervical Effacement: 60%  Fetal Station: -2  Cervical Consistency: soft  Cervical Position: mid-position     Contractions:   irregular                    LABS AND TESTING ORDERED:  1. Uterine and fetal monitoring  2. Urinalysis  3. Admit labs, covid testing    LAB RESULTS:    No results found for this or any previous visit (from the past 24 hour(s)).    Lab Results   Component Value Date    ABO O 08/31/2021    RH Positive 08/31/2021       Lab Results   Component Value Date    STREPGPB negative 08/31/2021                 External Prenatal Results     Pregnancy Outside " Results - Transcribed From Office Records - See Scanned Records For Details     Test Value Date Time    ABO ^ O  21     Rh ^ Positive  21     Antibody Screen ^ Normal  21     Varicella IgG       Rubella ^ immune  21     Hgb ^ 12.4 g/dL 21     Hct ^ 36 % 21     Glucose Fasting GTT       Glucose Tolerance Test 1 hour ^ 135  21     Glucose Tolerance Test 3 hour       Gonorrhea (discrete)       Chlamydia (discrete)       RPR ^ Non-Reactive  21     VDRL       Syphilis Antibody       HBsAg ^ Negative  21     Herpes Simplex Virus PCR       Herpes Simplex VIrus Culture       HIV ^ neg  21     Hep C RNA Quant PCR       Hep C Antibody       AFP       Group B Strep ^ negative  21     GBS Susceptibility to Clindamycin       GBS Susceptibility to Erythromycin       Fetal Fibronectin       Genetic Testing, Maternal Blood             Drug Screening     Test Value Date Time    Urine Drug Screen       Amphetamine Screen       Barbiturate Screen       Benzodiazepine Screen       Methadone Screen       Phencyclidine Screen       Opiates Screen       THC Screen       Cocaine Screen       Propoxyphene Screen       Buprenorphine Screen       Methamphetamine Screen       Oxycodone Screen       Tricyclic Antidepressants Screen             Legend    ^: Historical                          Impression:   @ 39w3d .  Final Diagnosis: SROM, occasional contractions    Plan:  1.Discussed with Dr Koch who will admit,  fetal and uterine monitoring  continuously, labor augmentation  Pitocin and analgesia with  epidural      Laura Maher MD  9/10/2021  00:18 EDT

## 2021-09-11 LAB
BACTERIA SPEC AEROBE CULT: NO GROWTH
BASOPHILS # BLD AUTO: 0.02 10*3/MM3 (ref 0–0.2)
BASOPHILS NFR BLD AUTO: 0.2 % (ref 0–1.5)
DEPRECATED RDW RBC AUTO: 38.7 FL (ref 37–54)
EOSINOPHIL # BLD AUTO: 0.08 10*3/MM3 (ref 0–0.4)
EOSINOPHIL NFR BLD AUTO: 0.6 % (ref 0.3–6.2)
ERYTHROCYTE [DISTWIDTH] IN BLOOD BY AUTOMATED COUNT: 12.8 % (ref 12.3–15.4)
HCT VFR BLD AUTO: 26 % (ref 34–46.6)
HGB BLD-MCNC: 8.8 G/DL (ref 12–15.9)
IMM GRANULOCYTES # BLD AUTO: 0.07 10*3/MM3 (ref 0–0.05)
IMM GRANULOCYTES NFR BLD AUTO: 0.6 % (ref 0–0.5)
LYMPHOCYTES # BLD AUTO: 0.72 10*3/MM3 (ref 0.7–3.1)
LYMPHOCYTES NFR BLD AUTO: 5.7 % (ref 19.6–45.3)
MCH RBC QN AUTO: 28.7 PG (ref 26.6–33)
MCHC RBC AUTO-ENTMCNC: 33.8 G/DL (ref 31.5–35.7)
MCV RBC AUTO: 84.7 FL (ref 79–97)
MONOCYTES # BLD AUTO: 0.6 10*3/MM3 (ref 0.1–0.9)
MONOCYTES NFR BLD AUTO: 4.7 % (ref 5–12)
NEUTROPHILS NFR BLD AUTO: 11.17 10*3/MM3 (ref 1.7–7)
NEUTROPHILS NFR BLD AUTO: 88.2 % (ref 42.7–76)
NRBC BLD AUTO-RTO: 0 /100 WBC (ref 0–0.2)
PLATELET # BLD AUTO: 176 10*3/MM3 (ref 140–450)
PMV BLD AUTO: 10.3 FL (ref 6–12)
RBC # BLD AUTO: 3.07 10*6/MM3 (ref 3.77–5.28)
WBC # BLD AUTO: 12.66 10*3/MM3 (ref 3.4–10.8)

## 2021-09-11 PROCEDURE — 85025 COMPLETE CBC W/AUTO DIFF WBC: CPT | Performed by: OBSTETRICS & GYNECOLOGY

## 2021-09-11 PROCEDURE — 0503F POSTPARTUM CARE VISIT: CPT | Performed by: OBSTETRICS & GYNECOLOGY

## 2021-09-11 RX ADMIN — IBUPROFEN 800 MG: 800 TABLET, FILM COATED ORAL at 08:55

## 2021-09-11 RX ADMIN — IBUPROFEN 800 MG: 800 TABLET, FILM COATED ORAL at 17:41

## 2021-09-11 RX ADMIN — DOCUSATE SODIUM 100 MG: 100 CAPSULE, LIQUID FILLED ORAL at 22:37

## 2021-09-11 RX ADMIN — IBUPROFEN 800 MG: 800 TABLET, FILM COATED ORAL at 00:32

## 2021-09-11 RX ADMIN — DOCUSATE SODIUM 100 MG: 100 CAPSULE, LIQUID FILLED ORAL at 08:54

## 2021-09-11 RX ADMIN — Medication 1 TABLET: at 08:54

## 2021-09-11 NOTE — PLAN OF CARE
Problem: Adult Inpatient Plan of Care  Goal: Plan of Care Review  2021 by Nancy Aleman RN  Outcome: Ongoing, Progressing  2021 by Nancy Aleman RN  Outcome: Ongoing, Progressing  Flowsheets (Taken 2021)  Progress: improving  Outcome Summary: doing well. vss. HR remains 110-120. pain well controlled with motrin. up ad katerina. voiding without difficulty. breastfeeding well.  Goal: Patient-Specific Goal (Individualized)  2021 by Nancy Aleman RN  Outcome: Ongoing, Progressing  2021 by Nancy Aleman RN  Outcome: Ongoing, Progressing  Goal: Absence of Hospital-Acquired Illness or Injury  2021 by Nancy Aleman RN  Outcome: Ongoing, Progressing  2021 by Nancy Aleman RN  Outcome: Ongoing, Progressing  Intervention: Identify and Manage Fall Risk  Recent Flowsheet Documentation  Taken 2021 0032 by Nancy Aleman RN  Safety Promotion/Fall Prevention: safety round/check completed  Taken 2021 0015 by Nancy Aleman RN  Safety Promotion/Fall Prevention: safety round/check completed  Taken 9/10/2021 2200 by Nancy Aleman RN  Safety Promotion/Fall Prevention: safety round/check completed  Taken 9/10/2021 2010 by Nancy Aleman RN  Safety Promotion/Fall Prevention: safety round/check completed  Goal: Optimal Comfort and Wellbeing  2021 by Nancy Aleman RN  Outcome: Ongoing, Progressing  2021 by Nancy Aleman RN  Outcome: Ongoing, Progressing  Intervention: Provide Person-Centered Care  Recent Flowsheet Documentation  Taken 9/10/2021 2010 by Nancy Aleman RN  Trust Relationship/Rapport: care explained  Goal: Readiness for Transition of Care  2021 by Nancy Aleman RN  Outcome: Ongoing, Progressing  2021 by Nancy Aleman RN  Outcome: Ongoing, Progressing     Problem:  Fall Injury Risk  Goal: Absence of Fall, Infant Drop and Related Injury  2021  0255 by Nancy Aleman RN  Outcome: Ongoing, Progressing  9/11/2021 0253 by Nancy Aleman RN  Outcome: Ongoing, Progressing  Intervention: Promote Injury-Free Environment  Recent Flowsheet Documentation  Taken 9/11/2021 0032 by Nancy Aleman RN  Safety Promotion/Fall Prevention: safety round/check completed  Taken 9/11/2021 0015 by Nancy Almean RN  Safety Promotion/Fall Prevention: safety round/check completed  Taken 9/10/2021 2200 by Nancy Aleman RN  Safety Promotion/Fall Prevention: safety round/check completed  Taken 9/10/2021 2010 by Nancy Aleman RN  Safety Promotion/Fall Prevention: safety round/check completed     Problem: Skin Injury Risk Increased  Goal: Skin Health and Integrity  9/11/2021 0255 by Nancy Aleman RN  Outcome: Ongoing, Progressing  9/11/2021 0253 by Nancy Aleman RN  Outcome: Ongoing, Progressing     Problem: Labor Pain (Labor)  Goal: Acceptable Pain Control  9/11/2021 0255 by Nancy Aleman RN  Outcome: Ongoing, Progressing  9/11/2021 0253 by Nancy Aleman RN  Outcome: Ongoing, Progressing     Problem: Adjustment to Role Transition (Postpartum Vaginal Delivery)  Goal: Successful Maternal Role Transition  Outcome: Ongoing, Progressing     Problem: Bleeding (Postpartum Vaginal Delivery)  Goal: Hemostasis  Outcome: Ongoing, Progressing     Problem: Infection (Postpartum Vaginal Delivery)  Goal: Absence of Infection Signs and Symptoms  Outcome: Ongoing, Progressing  Intervention: Prevent or Manage Infection  Recent Flowsheet Documentation  Taken 9/10/2021 2010 by Nancy Aleman RN  Perineal Care:   absorbent pad changed   perineum cleansed     Problem: Pain (Postpartum Vaginal Delivery)  Goal: Acceptable Pain Control  Outcome: Ongoing, Progressing  Intervention: Prevent or Manage Pain  Recent Flowsheet Documentation  Taken 9/11/2021 0032 by Nancy Aleman RN  Pain Management Interventions: see MAR     Problem: Breastfeeding  Goal: Effective  Breastfeeding  Outcome: Ongoing, Progressing   Goal Outcome Evaluation:           Progress: improving  Outcome Summary: doing well. vss. HR remains 110-120. pain well controlled with motrin. up ad katerina. voiding without difficulty. breastfeeding well.

## 2021-09-11 NOTE — PROGRESS NOTES
Postpartum Progress Note      Status post Vaginal Delivery: Doing well postoperatively.     1) postpartum care immediately following delivery : Continue routine postpartum care.  2) PPH--no concerning ongoing bleeding.  She denies symptoms of anemia.  CBC pending.    Rh status: O pos  Rubella: Immune  Gender: male--desires circumcision.  R/B/A d/w mom and she agrees to proceed.    Subjective     Postpartum Day 1: Vaginal delivery    The patient feels well. The patient denies emotional concerns. Pain is well controlled with current medications. The baby is well. The patient is ambulating well. The patient is tolerating a normal diet.     Objective     Vital signs in last 24 hours:  Temp:  [97.7 °F (36.5 °C)-99.5 °F (37.5 °C)] 97.7 °F (36.5 °C)  Heart Rate:  [] 97  Resp:  [16-18] 16  BP: ()/(40-74) 90/59      General:    alert, appears stated age and cooperative   Abdomen:  Soft, Non-tender    Lochia:  appropriate   Uterine Fundus:   firm   Ext    Edema trace   DVT Evaluation:  No evidence of DVT seen on physical exam.     Lab Results   Component Value Date    WBC 12.06 (H) 09/10/2021    HGB 11.2 (L) 09/10/2021    HCT 33.0 (L) 09/10/2021    MCV 85.7 09/10/2021     09/10/2021       Yudi Howe MD  9/11/2021  10:04 EDT

## 2021-09-11 NOTE — NURSING NOTE
Dr. Rojas notified that patient is having recurrent variable decelerations with moderate variability. After repositioning the patient multiple times, order received per telephone to give amnioinfusion 500 cc intrauterine now. r/v

## 2021-09-11 NOTE — LACTATION NOTE
This note was copied from a baby's chart.  Mother reports that infant has been latching well, has been sleepy, typically only staying awake to feed on one side per feeding. Infant just had circumcision, is very sleepy. Assisted mother with rousing infant, eventually able to latch infant, he is able to sustain nutritive suckle for 13 min with constant stimulation. Demonstrated hand expression and mother is able to express large, rolling drops of colostrum. Provided hand pump and electric pump and demonstrated use of both. Mother able to use hand pump and hand expression to obtain 10ml EBM. This was given to infant via syringe feed. Advised mother to continue to pump after feedings and top off with EBM if infant still sleepy/relutctant to feed, or only feeding from one side. Advised to feed every 2-3 hours and PRN, 15 minutes per side. Discussed potential for clusterfeeding tonight, encouraged unrestricted access to the breast and skin to skin.

## 2021-09-11 NOTE — LACTATION NOTE
Lactation Consult Note  Mother called for help with BF. Reported baby has been very sleepy.  Assisted mother in waking up the baby and in latching him in a football position to the left breast. Educated mom starting nose to nipple to obtain deep latch and baby was able to achieve it after multiple attempts and some suck training. Baby is latching well, has nutritive suckle, and has a good jaw rotation, but is falling asleep easily. Discussed ways to keep baby awake during BF. Encouraged mom to try to BF every 2-3 hours for 15 min. on each side. Educated on importance of deep latching, hand expression, how to know if baby is getting enough, different ways to rouse infant and burping him. Mom is able easily to express colostrum. PT reports that she already has PBP. She declines any questions and concerns at this time. Encouraged to call LC if needing further assistance.          Evaluation Completed: 9/10/2021 22:23 EDT  Patient Name: Harleen Purdy  :  2000  MRN:  [id number]     REFERRAL  INFORMATION:                          Date of Referral: 09/10/21   Person Making Referral: lactation consultant  Maternal Reason for Referral: breastfeeding currently  Infant Reason for Referral: sleepy    DELIVERY HISTORY:        Skin to skin initiation date/time: 9/10/2021  1:20 PM   Skin to skin end date/time:           MATERNAL ASSESSMENT:  Breast Size Issue: none (09/10/21 2155)  Breast Shape: Bilateral:, pendulous, other (see comments) (large) (09/10/21 2155)  Breast Density: Bilateral:, soft (09/10/21 2155)  Areola: Bilateral:, elastic (09/10/21 2155)  Nipples: Bilateral:, everted, graspable (09/10/21 2155)                INFANT ASSESSMENT:  Information for the patient's :  Elke Purdy [[id number]]   No past medical history on file.     Feeding Readiness Cues: sleeping (09/10/21 2155)      Feeding Tolerance/Success: arousal required, rooting (09/10/21 2155)               Feeding Interventions: arousal  required, latch assistance provided, lips stroked, sucking promoted (09/10/21 2155)               Breastfeeding: breastfeeding, left side only (09/10/21 2155)   Infant Positioning: clutch/football (09/10/21 2155)         Effective Latch During Feeding: yes (09/10/21 2155)   Suck/Swallow Coordination: present (09/10/21 2155)   Signs of Milk Transfer: audible swallow (09/10/21 2155)       Latch: 1-->repeated attempts, holds nipple in mouth, stimulate to suck (09/10/21 2155)   Audible Swallowin-->a few with stimulation (09/10/21 2155)   Type of Nipple: 2-->everted (after stimulation) (09/10/21 2155)   Comfort (Breast/Nipple): 2-->soft/nontender (09/10/21 2155)   Hold (Positioning): 1-->minimal assist, teach one side, mother does other, staff holds (09/10/21 2155)   Latch Score: 7 (09/10/21 2155)                    MATERNAL INFANT FEEDING:     Maternal Emotional State: receptive, relaxed (09/10/21 2155)  Infant Positioning: clutch/football (09/10/21 2155)   Signs of Milk Transfer: audible swallow (09/10/21 2155)  Pain with Feeding: no (09/10/21 2155)           Milk Ejection Reflex: present (09/10/21 2155)           Latch Assistance: minimal assistance (09/10/21 2155)                               EQUIPMENT TYPE:                                 BREAST PUMPING:          LACTATION REFERRALS:

## 2021-09-11 NOTE — NURSING NOTE
Dr. Rojas at bedside. Attempted to insert IUPC but with first attempt, met resistance. 2nd attempt was made and IUPC inserted without difficulty.

## 2021-09-11 NOTE — NURSING NOTE
Dr. Rojas called on cell phone. Notified him that patient's fundus is firm and at the umbilicus. Scant bleeding noted on postpartum assessment. Patient's heart rate ranges from 115-125. Dr. Rojas states patient may go up to postpartum unit once a bed becomes available.

## 2021-09-12 VITALS
DIASTOLIC BLOOD PRESSURE: 58 MMHG | WEIGHT: 156 LBS | HEART RATE: 74 BPM | OXYGEN SATURATION: 100 % | RESPIRATION RATE: 17 BRPM | BODY MASS INDEX: 25.07 KG/M2 | SYSTOLIC BLOOD PRESSURE: 100 MMHG | TEMPERATURE: 97.1 F | HEIGHT: 66 IN

## 2021-09-12 PROCEDURE — 0503F POSTPARTUM CARE VISIT: CPT | Performed by: OBSTETRICS & GYNECOLOGY

## 2021-09-12 RX ORDER — HYDROCODONE BITARTRATE AND ACETAMINOPHEN 5; 325 MG/1; MG/1
1 TABLET ORAL EVERY 6 HOURS PRN
Qty: 20 TABLET | Refills: 0 | Status: SHIPPED | OUTPATIENT
Start: 2021-09-12 | End: 2022-09-02

## 2021-09-12 RX ORDER — IBUPROFEN 800 MG/1
800 TABLET ORAL EVERY 8 HOURS PRN
Qty: 50 TABLET | Refills: 3 | Status: SHIPPED | OUTPATIENT
Start: 2021-09-12 | End: 2022-09-02

## 2021-09-12 RX ORDER — FERROUS SULFATE 325(65) MG
325 TABLET ORAL
Qty: 30 TABLET | Refills: 1 | Status: SHIPPED | OUTPATIENT
Start: 2021-09-12 | End: 2022-09-02

## 2021-09-12 RX ADMIN — Medication 1 TABLET: at 09:21

## 2021-09-12 RX ADMIN — DOCUSATE SODIUM 100 MG: 100 CAPSULE, LIQUID FILLED ORAL at 09:21

## 2021-09-12 RX ADMIN — IBUPROFEN 800 MG: 800 TABLET, FILM COATED ORAL at 02:55

## 2021-09-12 NOTE — PLAN OF CARE
Goal Outcome Evaluation:           Progress: improving  Outcome Summary: PP day 2, VSS, HGB stable, bleeding light, voiding without difficulty, taking motrin for pain as needed, working on breastfeeding, would like to see lactation before discharge

## 2021-09-12 NOTE — LACTATION NOTE
This note was copied from a baby's chart.  Mother reports infant fed more frequently overnight, feels she is getting a good latch, denies pain. She reports nipples a little tender, encouraged lanolin use. Milk starting to come in this morning. Discussed managing engorgement, reverse pressure softening, weight/output expectations, and provided OPLC info. Encouraged follow up as needed.

## 2021-09-12 NOTE — PROGRESS NOTES
Postpartum Progress Note      Status post Vaginal Delivery: Doing well postoperatively.     1) postpartum care immediately following delivery : Doing well, routine care.   2) Anemia, secondary to delivery loss. Bleeding markedly improved. Add iron for now.     Rh status: O positive  Rubella: Immune  Gender: Male  COVID ND     Subjective     Postpartum Day 2: Vaginal delivery    The patient feels well. The patient denies emotional concerns. Pain is well controlled with current medications. The baby is well. The patient is ambulating well. The patient is tolerating a normal diet.     Objective     Vital signs in last 24 hours:  Temp:  [97.1 °F (36.2 °C)-98 °F (36.7 °C)] 97.1 °F (36.2 °C)  Heart Rate:  [74-90] 74  Resp:  [17-18] 17  BP: ()/(58-69) 100/58      General:    alert, appears stated age and cooperative   Abdomen:  Soft, Non-tender    Lochia:  appropriate   Uterine Fundus:   firm   Ext    Edema 1+   DVT Evaluation:  No evidence of DVT seen on physical exam.     Lab Results   Component Value Date    WBC 12.66 (H) 09/11/2021    HGB 8.8 (L) 09/11/2021    HCT 26.0 (L) 09/11/2021    MCV 84.7 09/11/2021     09/11/2021       Ray Rojas MD  9/12/2021  09:25 EDT

## 2021-09-12 NOTE — DISCHARGE SUMMARY
Date of Discharge:  2021    Discharge Diagnosis: postpartum care immediately after delivery    Presenting Problem/History of Present Illness  Pregnancy [Z34.90]       Hospital Course  Patient is a 21 y.o. female  39w3d presented with labor.  For events surrounding her delivery please see delivery/op note.  Her postpartum course was uneventful and today PPD#2, she is ready for discharge.  She meets all milestones and criteria for discharge and instructions were reviewed and she voiced understanding.     Procedures Performed  Vaginal delivery        Consults:   Consults     No orders found from 2021 to 9/10/2021.          Pertinent Test Results: Hg 8.8 grams     Condition on Discharge:  Stable     Discharge Disposition  Home or Self Care    Discharge Medications     Discharge Medications      New Medications      Instructions Start Date   ferrous sulfate 325 (65 FE) MG tablet   325 mg, Oral, Daily With Breakfast      HYDROcodone-acetaminophen 5-325 MG per tablet  Commonly known as: NORCO   1 tablet, Oral, Every 6 Hours PRN      ibuprofen 800 MG tablet  Commonly known as: ADVIL,MOTRIN   800 mg, Oral, Every 8 Hours PRN         Continue These Medications      Instructions Start Date   Prenatal 28-0.8 MG tablet   Oral             Discharge Diet:   Diet Instructions     Advance Diet As Tolerated            Activity at Discharge:   Activity Instructions     Pelvic Rest            Follow-up Appointments  Future Appointments   Date Time Provider Department Center   2021  2:00 PM  [facility]   2021  2:30 PM Ray Rojas MD [facility]     Additional Instructions for the Follow-ups that You Need to Schedule     Discharge Follow-up with Specialty: Dr. Rojas; 6 Weeks   As directed      Specialty: Dr. Rojas    Follow Up: 6 Weeks               Test Results Pending at Discharge       Ray Rojas MD  21  09:29 EDT

## 2021-09-13 NOTE — PAYOR COMM NOTE
"Harleen Purdy (21 y.o. Female)       [facility]    [address] [place] [address]  Facility NPI: [id number]    Elmo Jolly  Fax: [phone]  Phone: [phone] (Sarah: [phone])    Subject: MEDICAL MATERNITY AUTH REQUEST     Reference #:  [id number]    Please don't hesitate to contact me with any questions or concerns.        Date of Birth Social Security Number Address Home Phone MRN    [id number]  [id number]   [facility] [address] [phone] [id number]    Caodaism Marital Status           Single       Admission Date Admission Type Admitting Provider Attending Provider Department, Room/Bed    9/9/21 Emergency Ken Koch MD  [facility], S321/1    Discharge Date Discharge Disposition Discharge Destination        9/12/2021 Home or Self Care              Attending Provider: (none)   Allergies: No Known Allergies    Isolation: None   Infection: None   Code Status: Prior    Ht: 167.6 cm (66\")   Wt: 70.8 kg (156 lb)    Admission Cmt: None   Principal Problem: Normal vaginal delivery [O80]                 Active Insurance as of 9/9/2021     Primary Coverage     Payor Plan Insurance Group Employer/Plan Group    [employer] [place] [employer]      Payor Plan Address Payor Plan Phone Number Payor Plan Fax Number Effective Dates    [address] [address] [phone]  9/9/2021 - None Entered    [place] [address]       Subscriber Name Subscriber Birth Date Member ID       HARLEEN PURDY 2000 [id number]                 Emergency Contacts      (Rel.) Home Phone Work Phone Mobile Phone    harvey levin (Significant Other) -- -- [phone]               History & Physical      ScobeeRay MD at 09/10/21 [address] [place] [address]  Obstetric History and Physical    Chief Complaint   Patient presents with   • Rupture of Membranes     JUDY. Noticed trickle of fluid around 2200. +FM. Denies vaginal bleeding. Occasional CTX           Patient " is a 21 y.o. female  currently at 39w3d, who presents with amniorrhexis/labor.    Her prenatal care was with Dr. Rojas and was complicated by transfer in at term (Moved from [place], [place]).        External Prenatal Results     Pregnancy Outside Results - Transcribed From Office Records - See Scanned Records For Details     Test Value Date Time    ABO  O  09/10/21 0121    Rh  Positive  09/10/21 0121    Antibody Screen  Negative  09/10/21 0121      ^ Normal  21     Varicella IgG       Rubella ^ immune  21     Hgb  11.2 g/dL 09/10/21 012      ^ 12.4 g/dL 21     Hct  33.0 % 09/10/21 0121      ^ 36 % 21     Glucose Fasting GTT       Glucose Tolerance Test 1 hour ^ 135  21     Glucose Tolerance Test 3 hour       Gonorrhea (discrete)       Chlamydia (discrete)       RPR ^ Non-Reactive  21     VDRL       Syphilis Antibody       HBsAg ^ Negative  21     Herpes Simplex Virus PCR       Herpes Simplex VIrus Culture       HIV ^ neg  21     Hep C RNA Quant PCR       Hep C Antibody       AFP       Group B Strep ^ negative  21     GBS Susceptibility to Clindamycin       GBS Susceptibility to Erythromycin       Fetal Fibronectin       Genetic Testing, Maternal Blood             Drug Screening     Test Value Date Time    Urine Drug Screen       Amphetamine Screen       Barbiturate Screen       Benzodiazepine Screen       Methadone Screen       Phencyclidine Screen       Opiates Screen       THC Screen       Cocaine Screen       Propoxyphene Screen       Buprenorphine Screen       Methamphetamine Screen       Oxycodone Screen       Tricyclic Antidepressants Screen             Legend    ^: Historical                           OB History    Para Term  AB Living   1 0 0 0 0 0   SAB TAB Ectopic Molar Multiple Live Births   0 0 0 0 0 0      # Outcome Date GA Lbr Lorenzo/2nd Weight Sex Delivery Anes PTL Lv   1 Current                  History reviewed. No pertinent past  medical history.       Past Surgical History:   Procedure Laterality Date   • LEG SURGERY            No current facility-administered medications on file prior to encounter.     Current Outpatient Medications on File Prior to Encounter   Medication Sig Dispense Refill   • Prenatal 28-0.8 MG tablet Take  by mouth.          No Known Allergies       Social History     Socioeconomic History   • Marital status: Single     Spouse name: Not on file   • Number of children: Not on file   • Years of education: Not on file   • Highest education level: Not on file   Tobacco Use   • Smoking status: Former Smoker     Types: Cigarettes   • Smokeless tobacco: Never Used   Vaping Use   • Vaping Use: Never used   Substance and Sexual Activity   • Alcohol use: Not Currently   • Drug use: Never   • Sexual activity: Yes     Partners: Male          Family History   Problem Relation Age of Onset   • No Known Problems Father    • No Known Problems Mother    • Breast cancer Neg Hx    • Ovarian cancer Neg Hx    • Uterine cancer Neg Hx    • Colon cancer Neg Hx    • Deep vein thrombosis Neg Hx    • Pulmonary embolism Neg Hx         Review of Systems   Constitutional: Negative for chills and fever.   Eyes: Negative for visual disturbance.   Gastrointestinal: Negative for abdominal pain, constipation and diarrhea.   Genitourinary: Positive for pelvic pain and vaginal discharge. Negative for vaginal bleeding.   Neurological: Negative for headache.   All other systems reviewed and are negative.      Temp:  [97.5 °F (36.4 °C)-98.8 °F (37.1 °C)] 97.8 °F (36.6 °C)  Heart Rate:  [] 96  Resp:  [16-18] 18  BP: ()/(38-84) 91/49      Physical Exam  Vitals reviewed. Exam conducted with a chaperone present.   Constitutional:       General: She is not in acute distress.     Appearance: She is well-developed.   HENT:      Head: Normocephalic and atraumatic.   Eyes:      General:         Right eye: No discharge.         Left eye: No discharge.       Conjunctiva/sclera: Conjunctivae normal.   Neck:      Thyroid: No thyromegaly.   Cardiovascular:      Rate and Rhythm: Normal rate and regular rhythm.      Heart sounds: Normal heart sounds. No murmur heard.     Pulmonary:      Effort: Pulmonary effort is normal. No respiratory distress.      Breath sounds: Normal breath sounds.   Abdominal:      General: There is distension (EFW 7# ).      Palpations: Abdomen is soft.      Tenderness: There is no abdominal tenderness.   Genitourinary:     Labia:         Right: No lesion.         Left: No lesion.       Cervix: No cervical bleeding (Cx 4/80/-2, IUPC placed ).      Uterus: Enlarged (Gravid ).    Musculoskeletal:         General: Normal range of motion.      Cervical back: Normal range of motion and neck supple.      Right lower leg: Edema (trace ) present.      Left lower leg: Edema present.   Lymphadenopathy:      Cervical: No cervical adenopathy.   Skin:     General: Skin is warm and dry.      Findings: No rash.   Neurological:      Mental Status: She is alert and oriented to person, place, and time.      Deep Tendon Reflexes: Reflexes normal.   Psychiatric:         Behavior: Behavior normal.         Thought Content: Thought content normal.         Judgment: Judgment normal.           FHT - Class 2, intermittent to regular variable decelerations with contractions.   Drain - q 3 min     Lab Results   Component Value Date    WBC 12.06 (H) 09/10/2021    HGB 11.2 (L) 09/10/2021    HCT 33.0 (L) 09/10/2021    MCV 85.7 09/10/2021     09/10/2021         Pregnancy      Assessment:  1.  Intrauterine pregnancy at 39w3d weeks gestation with variable decelerations present fetal status.    2.  labor  with ROM  3.  Obstetrical history uncomplicated  4.  GBS status: .Negative     Plan:  1. Vaginal anticipated, fetal and uterine monitoring  continuously, labor augmentation  Pitocin and analgesia with  epidural  2. Plan of care has been reviewed with patient and significant  other  Primary concern is intermittent class II tracing with variable decelerations. Have worked on hypotension and IUPC with amnioinfusion, still appears mostly positional. Working to optimize tracing and allow labor.. Aware if unable to do this could have to proceed with Primary    3.  Risks, benefits of treatment plan have been discussed.  4.  All questions have been answered.        Ray Rojas MD  9/10/2021  11:23 EDT    Electronically signed by Ray Rojas MD at 09/10/21 1129         Facility-Administered Medications as of 2021   Medication Dose Route Frequency Provider Last Rate Last Admin   • [] erythromycin (ROMYCIN) 5 MG/GM ophthalmic ointment  - ADS Override Pull            • [COMPLETED] miSOPROStol (CYTOTEC) tablet 800 mcg  800 mcg Rectal Once PRN Ken Koch MD   800 mcg at 09/10/21 1308   • [COMPLETED] oxytocin in sodium chloride (PITOCIN) 30 UNIT/500ML infusion solution  999 mL/hr Intravenous Once Ken Koch  mL/hr at 09/10/21 1301 999 mL/hr at 09/10/21 1301    Followed by   • [] oxytocin in sodium chloride (PITOCIN) 30 UNIT/500ML infusion solution  250 mL/hr Intravenous Continuous PRN Ken Koch  mL/hr at 09/10/21 1325 250 mL/hr at 09/10/21 1325    Followed by   • [COMPLETED] oxytocin in sodium chloride (PITOCIN) 30 UNIT/500ML infusion solution  125 mL/hr Intravenous Continuous PRN Ken Koch  mL/hr at 09/10/21 1414 125 mL/hr at 09/10/21 1414   • [] phytonadione (VITAMIN K) 1 MG/0.5ML injection  - ADS Override Pull            • [COMPLETED] sodium chloride 0.9 % bolus 500 mL  500 mL Intrauterine Once Ray Rojas MD 2,000 mL/hr at 09/10/21 1153 Restarted at 09/10/21 1153   • [COMPLETED] terbutaline (BRETHINE) injection 0.25 mg  0.25 mg Subcutaneous Once Ray Rojas MD   0.25 mg at 09/10/21 0840   • [COMPLETED] Tranexamic Acid Sterile Solution 1,000 mg  1,000 mg Intravenous Once Ray Rojas  MD Aquiles   1,000 mg at 09/10/21 1307     Blood Administration Record (From admission, onward)    None        Lab Results (last 72 hours)     Procedure Component Value Units Date/Time    CBC & Differential [[id number]]  (Abnormal) Collected: 09/11/21 1025    Specimen: Blood Updated: 09/11/21 1133    Narrative:      The following orders were created for panel order CBC & Differential.  Procedure                               Abnormality         Status                     ---------                               -----------         ------                     CBC Auto Differential[[id number]]        Abnormal            Final result                 Please view results for these tests on the individual orders.    CBC Auto Differential [[id number]]  (Abnormal) Collected: 09/11/21 1025    Specimen: Blood Updated: 09/11/21 1133     WBC 12.66 10*3/mm3      RBC 3.07 10*6/mm3      Hemoglobin 8.8 g/dL      Hematocrit 26.0 %      MCV 84.7 fL      MCH 28.7 pg      MCHC 33.8 g/dL      RDW 12.8 %      RDW-SD 38.7 fl      MPV 10.3 fL      Platelets 176 10*3/mm3      Neutrophil % 88.2 %      Lymphocyte % 5.7 %      Monocyte % 4.7 %      Eosinophil % 0.6 %      Basophil % 0.2 %      Immature Grans % 0.6 %      Neutrophils, Absolute 11.17 10*3/mm3      Lymphocytes, Absolute 0.72 10*3/mm3      Monocytes, Absolute 0.60 10*3/mm3      Eosinophils, Absolute 0.08 10*3/mm3      Basophils, Absolute 0.02 10*3/mm3      Immature Grans, Absolute 0.07 10*3/mm3      nRBC 0.0 /100 WBC     Urine Culture - Urine, Urine, Clean Catch [[id number]]  (Normal) Collected: 09/10/21 0015    Specimen: Urine, Clean Catch Updated: 09/11/21 1002     Urine Culture No growth    Urinalysis, Microscopic Only - Urine, Clean Catch [[id number]]  (Abnormal) Collected: 09/10/21 0015    Specimen: Urine, Clean Catch Updated: 09/10/21 0208     RBC, UA 3-5 /HPF      WBC, UA 21-30 /HPF      Bacteria, UA 1+ /HPF      Squamous Epithelial Cells, UA 3-6 /HPF      Hyaline Casts, UA None  Seen /LPF      Amorphous Crystals, UA Small/1+ /HPF      Methodology Manual Light Microscopy    CBC (No Diff) [[id number]]  (Abnormal) Collected: 09/10/21 0121    Specimen: Blood Updated: 09/10/21 0139     WBC 12.06 10*3/mm3      RBC 3.85 10*6/mm3      Hemoglobin 11.2 g/dL      Hematocrit 33.0 %      MCV 85.7 fL      MCH 29.1 pg      MCHC 33.9 g/dL      RDW 13.1 %      RDW-SD 40.9 fl      MPV 10.3 fL      Platelets 229 10*3/mm3     COVID PRE-OP / PRE-PROCEDURE SCREENING ORDER (NO ISOLATION) - Swab, Nasopharynx [[id number]]  (Normal) Collected: 09/10/21 0013    Specimen: Swab from Nasopharynx Updated: 09/10/21 0113    Narrative:      The following orders were created for panel order COVID PRE-OP / PRE-PROCEDURE SCREENING ORDER (NO ISOLATION) - Swab, Nasopharynx.  Procedure                               Abnormality         Status                     ---------                               -----------         ------                     COVID-19,[facility]...[[id number]]  Normal              Final result                 Please view results for these tests on the individual orders.    COVID-19,[facility] CEPHEID/LULA NP SWAB IN TRANSPORT MEDIA 8-12 HR TAT - Swab, Nasopharynx [[id number]]  (Normal) Collected: 09/10/21 0013    Specimen: Swab from Nasopharynx Updated: 09/10/21 0113     COVID19 Not Detected    Narrative:      Fact sheet for providers: https://www.fda.gov/media/581406/download     Fact sheet for patients: https://www.fda.gov/media/521383/download    Urinalysis With Culture If Indicated - Urine, Clean Catch [[id number]]  (Abnormal) Collected: 09/10/21 0015    Specimen: Urine, Clean Catch Updated: 09/10/21 0049     Color, UA Yellow     Appearance, UA Cloudy     pH, UA 7.0     Specific Gravity, UA 1.007     Glucose, UA Negative     Ketones, UA Negative     Bilirubin, UA Negative     Blood, UA Small (1+)     Protein, UA Trace     Leuk Esterase, UA Large (3+)     Nitrite, UA Negative     Urobilinogen, UA 0.2  E.U./dL          Imaging Results (Last 72 Hours)     ** No results found for the last 72 hours. **        ECG/EMG Results (last 7 days)     ** No results found for the last 168 hours. **        Orders (last 7 days)      Start     Ordered    09/12/21 0928  Discharge patient  Once      09/12/21 0928 09/12/21 0000  ibuprofen (ADVIL,MOTRIN) 800 MG tablet  Every 8 Hours PRN      09/12/21 0928    09/12/21 0000  HYDROcodone-acetaminophen (NORCO) 5-325 MG per tablet  Every 6 Hours PRN      09/12/21 0928    09/12/21 0000  ferrous sulfate 325 (65 FE) MG tablet  Daily With Breakfast      09/12/21 0928 09/12/21 0000  Advance Diet As Tolerated      09/12/21 0928 09/12/21 0000  Pelvic Rest      09/12/21 0928 09/12/21 0000  Discharge Follow-up with Specialty: Dr. Rojas; 6 Weeks      09/12/21 0928 09/11/21 0800  Sitz Bath  3 Times Daily,   Status:  Canceled     Comments: PRN    09/10/21 1636    09/11/21 0600  CBC & Differential  Morning Draw     Comments: Postpartum Day 1      09/10/21 1636 09/11/21 0600  CBC Auto Differential  PROCEDURE ONCE     Comments: Postpartum Day 1      09/10/21 2209    09/11/21 0000  bisacodyl (DULCOLAX) suppository 10 mg  Daily PRN,   Status:  Discontinued      09/10/21 1636    09/10/21 2215  Transfer to postpartum when discharge criteria met.  Until Discontinued,   Status:  Canceled      09/10/21 2214    09/10/21 2100  docusate sodium (COLACE) capsule 100 mg  2 Times Daily,   Status:  Discontinued      09/10/21 1636    09/10/21 1730  prenatal vitamin tablet 1 tablet  Daily,   Status:  Discontinued      09/10/21 1636    09/10/21 1637  Transfer Patient  Once      09/10/21 1636    09/10/21 1637  Code Status and Medical Interventions:  Continuous,   Status:  Canceled      09/10/21 1636    09/10/21 1637  Vital Signs Per hospital policy  Per Hospital Policy,   Status:  Canceled      09/10/21 1636    09/10/21 1637  Notify Physician  Until Discontinued,   Status:  Canceled      09/10/21 9213     09/10/21 1637  Up Ad Norma  Until Discontinued,   Status:  Canceled      09/10/21 1636    09/10/21 1637  Up As Tolerated  Until Discontinued,   Status:  Canceled      09/10/21 1636    09/10/21 1637  Ambulate Patient  Every Shift,   Status:  Canceled      09/10/21 1636    09/10/21 1637  Patient May Shower  Once,   Status:  Canceled      09/10/21 1636    09/10/21 1637  Diet Regular  Diet Effective Now,   Status:  Canceled      09/10/21 1636    09/10/21 1637  Advance Diet as Tolerated  Until Discontinued,   Status:  Canceled      09/10/21 1636    09/10/21 1637  Fundal and Lochia Check  Per Hospital Policy,   Status:  Canceled     Comments: Q 15 min x 4, Q 30 min x 2, then Q Shift    09/10/21 1636    09/10/21 1637  RN to Assess Rh Status & Place RhIG Evaluation Order if Indicated  Continuous,   Status:  Canceled      09/10/21 1636    09/10/21 1637  Bladder Assessment  Per Order Details,   Status:  Canceled     Comments: Postpartum 1) Upon Admission to Unit & Every 4 Hours PRN Until Voiding. 2) Out of Bed to Void in 8 Hours.    09/10/21 1636    09/10/21 1637  Straight Cath  Per Order Details,   Status:  Canceled     Comments: Postpartum: If Distended & Unable to Void, May Repeat Once.    09/10/21 1636    09/10/21 1637  Indwelling Urinary Catheter  Per Order Details,   Status:  Canceled     Comments: Postpartum : After Straight Cathed x2 or if Greater Than 1000mL Residual, Insert Indwelling Urinary Catheter Until Further MD Order.    09/10/21 1636    09/10/21 1637  Remove Vaginal Packing  Once,   Status:  Canceled      09/10/21 1636    09/10/21 1637  Breast pump to bed  Once,   Status:  Canceled      09/10/21 1636    09/10/21 1637  If indicated -- Please administer RH Immunoglobulin based on results of cord blood evaluation and fetal screen lab tests, pharmacy to dispense  Continuous,   Status:  Canceled     Comments: See process instructions for reference range details.    09/10/21 1636    09/10/21 1636  Waffle Cushion   As Needed,   Status:  Canceled     Comments: For perineal discomfort    09/10/21 1636    09/10/21 1636  Warm compress  As Needed,   Status:  Canceled      09/10/21 1636    09/10/21 1636  sodium chloride 0.9 % flush 1-10 mL  As Needed,   Status:  Discontinued      09/10/21 1636    09/10/21 1636  ibuprofen (ADVIL,MOTRIN) tablet 800 mg  Every 8 Hours PRN,   Status:  Discontinued      09/10/21 1636    09/10/21 1636  HYDROcodone-acetaminophen (NORCO) 5-325 MG per tablet 1 tablet  Every 4 Hours PRN,   Status:  Discontinued      09/10/21 1636    09/10/21 1636  miSOPROStol (CYTOTEC) tablet 800 mcg  As Needed,   Status:  Discontinued      09/10/21 1636    09/10/21 1636  hydrOXYzine (ATARAX) tablet 50 mg  Nightly PRN,   Status:  Discontinued      09/10/21 1636    09/10/21 1636  magnesium hydroxide (MILK OF MAGNESIA) suspension 2400 mg/10mL 10 mL  Daily PRN,   Status:  Discontinued      09/10/21 1636    09/10/21 1636  all purpose nipple ointment  4 Times Daily PRN,   Status:  Discontinued      09/10/21 1636    09/10/21 1636  benzocaine-menthol (DERMOPLAST) 20-0.5 % topical spray  As Needed,   Status:  Discontinued      09/10/21 1636    09/10/21 1636  Hydrocort-Pramoxine (Perianal) (PROCTOFOAM-HS) 1-1 % rectal foam  As Needed,   Status:  Discontinued      09/10/21 1636    09/10/21 1636  Hydrocortisone (Perianal) (ANUSOL-HC) 2.5 % rectal cream 1 application  As Needed,   Status:  Discontinued      09/10/21 1636    09/10/21 1636  benzocaine (AMERICAINE) 20 % rectal ointment 1 application  As Needed,   Status:  Discontinued      09/10/21 1636    09/10/21 1636  promethazine (PHENERGAN) tablet 12.5 mg  Every 4 Hours PRN,   Status:  Discontinued      09/10/21 1636    09/10/21 1636  Apply Ice to Perineum  As Needed,   Status:  Canceled     Comments: For 20 min q 2 hrs    09/10/21 1636    09/10/21 1636  Donut Ring  As Needed,   Status:  Canceled     Comments: For perineal pain    09/10/21 1636    09/10/21 1636  Kpad  As Needed,    Status:  Canceled     Comments: For pain    09/10/21 1636    09/10/21 1636  Apply witch hazel pads / TUCKS to perineum as needed for comfort PRN  As Needed,   Status:  Canceled      09/10/21 1636    09/10/21 1636  Apply ace wrap, tight bra, or binder  As Needed,   Status:  Canceled      09/10/21 1636    09/10/21 1636  Apply ice packs  As Needed,   Status:  Canceled      09/10/21 1636    09/10/21 1634  Diet Regular  Diet Effective Now,   Status:  Canceled     Comments: Dinner 9/10-  salad- ranch, baked potato soup, desert on menu, ginger ale x 2    09/10/21 1634    09/10/21 1554  VTE Prophylaxis Not Indicated: No Risk Factors (0); </= 3 (Low Risk)  Once      09/10/21 1554    09/10/21 1545  Tranexamic Acid Sterile Solution 1,000 mg  Once      09/10/21 1454    09/10/21 1516  Notify Provider (Specified)  Until Discontinued,   Status:  Canceled      09/10/21 1515    09/10/21 1516  Vital Signs Per Hospital Policy  Per Hospital Policy,   Status:  Canceled      09/10/21 1515    09/10/21 1516  Fundal & Lochia Check  Per Order Details,   Status:  Canceled     Comments: Every 15 Minutes x4, Then Every 30 Minutes x2, Then Every Shift    09/10/21 1515    09/10/21 1516  Fundal & Lochia Check  Every Shift,   Status:  Canceled      09/10/21 1515    09/10/21 1516  Diet Regular  Diet Effective Now,   Status:  Canceled      09/10/21 1515    09/10/21 1400  Tranexamic Acid 1,000 mg in sodium chloride 0.9 % 100 mL  Once,   Status:  Discontinued      09/10/21 1307    09/10/21 1323  oxytocin in sodium chloride (PITOCIN) 30 UNIT/500ML infusion solution  Continuous PRN      09/10/21 1123    09/10/21 1307  methylergonovine (METHERGINE) injection 200 mcg  Once As Needed,   Status:  Discontinued      09/10/21 1307    09/10/21 1307  carboprost (HEMABATE) injection 250 mcg  Every 15 Minutes PRN,   Status:  Discontinued      09/10/21 1307    09/10/21 1307  miSOPROStol (CYTOTEC) tablet 800 mcg  Once As Needed      09/10/21 1307    09/10/21 1300   terbutaline (BRETHINE) injection 0.25 mg  Once,   Status:  Discontinued      09/10/21 1201    09/10/21 1246  erythromycin (ROMYCIN) 5 MG/GM ophthalmic ointment  - ADS Override Pull     Note to Pharmacy: Created by cabinet override    09/10/21 1246    09/10/21 1246  phytonadione (VITAMIN K) 1 MG/0.5ML injection  - ADS Override Pull     Note to Pharmacy: Created by cabinet override    09/10/21 1246    09/10/21 1245  sodium chloride 0.9 % bolus 300 mL  Once,   Status:  Discontinued      09/10/21 1152    09/10/21 1223  oxytocin in sodium chloride (PITOCIN) 30 UNIT/500ML infusion solution  Continuous PRN      09/10/21 1123    09/10/21 1151  Amnioinfusion Through IUPC  Once,   Status:  Canceled      09/10/21 1152    09/10/21 1123  oxytocin in sodium chloride (PITOCIN) 30 UNIT/500ML infusion solution  Once      09/10/21 1123    09/10/21 1030  sodium chloride 0.9 % bolus 500 mL  Once      09/10/21 0941    09/10/21 0942  Amnioinfusion Through IUPC  Once,   Status:  Canceled      09/10/21 0941    09/10/21 0930  terbutaline (BRETHINE) injection 0.25 mg  Once      09/10/21 0840    09/10/21 0245  fentaNYL (2 mcg/mL) and ropivacaine (0.2%) in 100 mL  Continuous,   Status:  Discontinued      09/10/21 0157    09/10/21 0158  Vital Signs Per Anesthesia Guidelines  Continuous,   Status:  Canceled     Comments: Every 2 Minutes x5, Every 5 Minutes x4, then if Stable Every 15 Minutes    09/10/21 0157    09/10/21 0158  Start IV with #16 or #18 gauge angiocath.  Once,   Status:  Canceled      09/10/21 0157    09/10/21 0158  Cardiac Monitoring  Continuous,   Status:  Canceled      09/10/21 0157    09/10/21 0158  Fetal Heart Rate Monitor  Once,   Status:  Canceled      09/10/21 0157    09/10/21 0158  Nurse or anesthesiologist to remain with patient for 15 minutes following dosing.  Until Discontinued,   Status:  Canceled      09/10/21 0157    09/10/21 0158  Facilitate maternal postion on side and maintain uterine displacement.  Until  Discontinued,   Status:  Canceled      09/10/21 0157    09/10/21 0158  Consult anesthesia services prior to changing epidural infusion/rate.  Until Discontinued,   Status:  Canceled      09/10/21 0157    09/10/21 0158  Nurse may remove epidural catheter after delivery.  Until Discontinued,   Status:  Canceled      09/10/21 0157    09/10/21 0158  Notify physician for the following conditions:  Until Discontinued,   Status:  Canceled      09/10/21 0157    09/10/21 0157  ePHEDrine injection 5 mg  As Needed,   Status:  Discontinued      09/10/21 0157    09/10/21 0157  diphenhydrAMINE (BENADRYL) injection 12.5 mg  Every 8 Hours PRN,   Status:  Discontinued      09/10/21 0157    09/10/21 0157  ondansetron (ZOFRAN) injection 4 mg  Once As Needed,   Status:  Discontinued      09/10/21 0157    09/10/21 0157  famotidine (PEPCID) injection 20 mg  Once As Needed,   Status:  Discontinued      09/10/21 0157    09/10/21 0130  sodium chloride 0.9 % flush 10 mL  Every 12 Hours Scheduled,   Status:  Discontinued      09/10/21 0041    09/10/21 0130  mineral oil liquid 30 mL  Once,   Status:  Discontinued      09/10/21 0041    09/10/21 0130  lactated ringers infusion  Continuous,   Status:  Discontinued      09/10/21 0041    09/10/21 0130  oxytocin in sodium chloride (PITOCIN) 30 UNIT/500ML infusion solution  Titrated,   Status:  Discontinued      09/10/21 0041    09/10/21 0130  famotidine (PEPCID) injection 20 mg  Every 12 Hours Scheduled,   Status:  Discontinued      09/10/21 0041    09/10/21 0130  famotidine (PEPCID) tablet 20 mg  Every 12 Hours Scheduled,   Status:  Discontinued      09/10/21 0041    09/10/21 0042  Obtain Informed Consent  Once      09/10/21 0041    09/10/21 0042  Mini-Prep Prior to Delivery  Once,   Status:  Canceled      09/10/21 0041    09/10/21 0042  Continuous Fetal Monitoring With NST on Admission and Prior to Initiation of Oxytocin.  Per Order Details,   Status:  Canceled     Comments: Continuous Fetal  Monitoring With NST on Admission & Prior to Initiation of Oxytocin.    09/10/21 0041    09/10/21 0042  Notify Provider (Specified)  Until Discontinued,   Status:  Canceled      09/10/21 0041    09/10/21 0042  Notify Provider of Tachysystole (Per Hospital Algorithm)  Until Discontinued,   Status:  Canceled      09/10/21 0041    09/10/21 0042  Notify Provider if Membranes Ruptured, Bleeding Greater Than 1 Pad Per Hour, Fetal Heart Tone Abnormality or Severe Pain  Until Discontinued,   Status:  Canceled      09/10/21 0041    09/10/21 0042  Initiate Group Beta Strep (GBS) Prophylaxis Protocol, If Criteria Met  Continuous,   Status:  Canceled     Comments: NO TREATMENT RECOMMENDED IF: 1) Maternal GBS Status Known Negative 2) Scheduled  Birth With Intact Membranes, Not in Labor 3) Maternal GBS Status Unknown, No Risk Factors  TREAT WITH ANTIBIOTICS IF:  1) Maternal GBS Status Known Positive 2) Maternal GBS Status Unknown With Risk Factors: a)  Previous Infant Affected By GBS Infection b) GBS Urinary Tract Infection (UTI) or Bacteriuria During Pregnancy c) Unexplained Maternal Fever (100.4F (38C) or Greater) During Labor d)  Prolonged Rupture of Membranes (18 or More Hours) e) Gestational Age Less Than 37 Weeks    09/10/21 0041    09/10/21 0042  NPO Diet NPO Except: Ice Chips  Diet Effective Now,   Status:  Canceled      09/10/21 0041    09/10/21 0042  CBC (No Diff)  Once      09/10/21 0041    09/10/21 0042  Type & Screen  Once      09/10/21 0041    09/10/21 0042  Insert Peripheral IV  Once,   Status:  Canceled      09/10/21 0041    09/10/21 0042  Saline Lock & Maintain IV Access  Continuous,   Status:  Canceled      09/10/21 0041    09/10/21 0041  Position Change - For Intra-Uterine Resusitation for Hypertonus, HyperStimulation or Non-Reassuring Fetal Status  As Needed,   Status:  Canceled      09/10/21 0041    09/10/21 0041  lidocaine PF 1% (XYLOCAINE) injection 5 mL  As Needed,   Status:  Discontinued       09/10/21 0041    09/10/21 0041  sodium chloride 0.9 % flush 10 mL  As Needed,   Status:  Discontinued      09/10/21 0041    09/10/21 0041  acetaminophen (TYLENOL) tablet 650 mg  Every 4 Hours PRN,   Status:  Discontinued      09/10/21 0041    09/10/21 0041  lactated ringers bolus 1,000 mL  Once As Needed,   Status:  Discontinued      09/10/21 0041    09/10/21 0040  ondansetron (ZOFRAN) tablet 4 mg  Every 6 Hours PRN,   Status:  Discontinued      09/10/21 0041    09/10/21 0040  ondansetron (ZOFRAN) injection 4 mg  Every 6 Hours PRN,   Status:  Discontinued      09/10/21 0041    09/10/21 0039  Urine Culture - Urine, Urine, Clean Catch  Once     Comments: Collect and Hold for gestational age > 24 weeks      09/10/21 0038    09/10/21 0039  Urinalysis, Microscopic Only - Urine, Clean Catch  Once     Comments: Collect and Hold for gestational age > 24 weeks      09/10/21 0038    09/10/21 0039  Admit To Obstetrics Inpatient  Once      09/10/21 0041    09/10/21 0039  VTE Prophylaxis Not Indicated: No Risk Factors (0); </= 3 (Low Risk)  Once      09/10/21 0041    09/10/21 0006  COVID PRE-OP / PRE-PROCEDURE SCREENING ORDER (NO ISOLATION) - Swab, Nasopharynx  Once      09/10/21 0006    09/10/21 0006  COVID-19, [facility] IN-HOUSE CEPHEID/LULA NP SWAB IN TRANSPORT MEDIA 8-12 HR TAT - Swab, Nasopharynx  PROCEDURE ONCE      09/10/21 0006    09/09/21 2342  Vital Signs  Per Hospital Policy,   Status:  Canceled     Comments: Repeat blood pressure every 30 minutes, until specified.    09/09/21 2341    09/09/21 2342  Fetal Nonstress Test  Once,   Status:  Canceled     Comments: For any patient >= 24 wks gestation.    09/09/21 2341    09/09/21 2342  Pulse Oximetry, Spot  Once      09/09/21 2341    09/09/21 2342  POC Glucose STAT  STAT,   Status:  Canceled     Comments: For any patient with any type of diabetes.      09/09/21 2341    09/09/21 2342  Urinalysis With Culture If Indicated - Urine, Clean Catch  STAT     Comments: Collect and Hold  for gestational age > 24 weeks      21 2341    21 234  Continuous Uterine Monitoring - For Gestational Age >24 weeks  Once,   Status:  Canceled      21 2341    21 234  NPO Diet  Diet Effective Now,   Status:  Canceled      21 2341    21 234  Vaginal Exam  Once,   Status:  Canceled     Comments: Perform unless patient has vaginal bleeding without known placental site, known placental previa, <36 weeks with no abdominal , or complain of ROM and <36 weeks    21 2341    21 234  POC PH Fluid (Nitrazine)  Once,   Status:  Canceled     Comments: If patient is presenting with possible ROM or leaking      21                   Operative/Procedure Notes (last 7 days) (Notes from 21 1558 through 21 1558)      Ray Rojas MD at 09/10/21 1543          [facility]  Vaginal Delivery Note    Delivery    Predelivery Diagnoses: 1) Pregnancy at 39w3d                                                      Postdelivery Diagnoses 1) Pregnancy at 39w3d                                              2) Delayed postpartum hemorrhage                                           Attending :  Ray Rojas MD     Procedure : Obstetrical controlled vaginal delivery    Delivery Narrative :     Harleen Purdy is a 21 y.o. year old  @ 39w3d estimated gestational age. She presented to L&D for amniorrhexis.  Her prenatal care was with Dr Rojas and was uncomplicated, but transferred into the practice at 37 weeks due to moving from [place], [place]. She was admitted, and after a short period of time approximately 3 hours started on Pitocin augmentation.  By the time of my arrival this morning, she had received her epidural anesthesia and the staff called me concerned about some recurrent variable decelerations.  Initially we believe those to be related to hypotension from her epidural, but they initially did not respond to treatment.  Examination showed her to be  3 cm and IUPC was placed.  When she continued to have variable decelerations, amnioinfusion was ordered.  When that did not completely eradicate the decelerations examination showed her to be 6 to 7 cm.  During the next 2 or so hours we would continue to have periods of significant decelerations, but she appeared to also be rapidly laboring shortly after 1230 this afternoon, I checked her and found her to be completely dilated and +1 to +2 station.  Having concerns over her fetal tracing we elected to start pushing and try to deliver, within a short time she was able to she was able to deliver.    Once the decision had been made to start pushing, she was prepped in the lithotomy position, and was doing well in her pushing efforts.  With delivery of the fetal head in OA presentation, bulb suction was performed, then using a hong type maneuver, pressure was placed along the posterior aspect of the anterior shoulder and it delivered without difficulty. One nuchal cord noted, and reduced after delivery. The infant showed good cry and tone and was placed upon the Mother's abdomen. After a thirty second delay, I then clamped the cord and it was cut.  Care of the infant was then turned over to the delivery team. Cord blood was obtained and then using gentle pressure the placenta was delivered spontaneous/intact and noted to have 3 vessel cord.  At that point I undertook inspection of the perineum and vulva and I repaired bilateral labial lacerations using 3-0 Monocryl in standard fashion with good hemostatic and cosmetic results.       During the evaluation and laceration repair, the patient had an episode of excessive uterine bleeding that was probably 8 to 900 cc pouring out her vagina.  Immediately began uterine exploration with no evidence of retained tissue problems, along with bimanual massage with good return of uterine tone.  The postpartum hemorrhage cart was brought into the room, the patient was given a dose of  TXA.  Misoprostol 800 mcg was placed in the rectum and a return to normal tone was noted.  Minimal further bleeding was seen, but decision made to watch the patient closely.    After repair of all lacerations, the area was noted to be hemostatic and all sponge and needle counts were correct. A vaginal exam and rectal exam showed no issues with retained equipment or suture in an abnormal place.      There was one family member(s) noted to be in the room with this patient.            Delivery: Vaginal, Spontaneous     YOB: 2020    Time of Birth: 12:57 PM      Anesthesia: Epidural             EBL: QBL 1193 cc           Infant    Findings: male  infant     Infant observations: Weight: 3426 g (7 lb 8.9 oz)   Length: 20  in  Observations/Comments:  scale 1      Apgars: 8  @ 1 minute /    9  @ 5 minutes       Complications  Postpartum hemorrhage. See note.   TXA and misoprostol able to control with bimanual massage.     Disposition  Mother to Mother Baby/Postpartum  in stable condition currently.  Baby to remains with mom  in stable condition currently.      Ray Rojas MD  09/10/21  15:43 EDT          Electronically signed by Ray Rojas MD at 09/10/21 1552          Physician Progress Notes (last 7 days) (Notes from 09/06/21 1558 through 09/13/21 1558)      Ray Rojas MD at 09/12/21 0968          Postpartum Progress Note      Status post Vaginal Delivery: Doing well postoperatively.     1) postpartum care immediately following delivery : Doing well, routine care.   2) Anemia, secondary to delivery loss. Bleeding markedly improved. Add iron for now.     Rh status: O positive  Rubella: Immune  Gender: Male  COVID ND     Subjective     Postpartum Day 2: Vaginal delivery    The patient feels well. The patient denies emotional concerns. Pain is well controlled with current medications. The baby is well. The patient is ambulating well. The patient is tolerating a normal diet.      Objective     Vital signs in last 24 hours:  Temp:  [97.1 °F (36.2 °C)-98 °F (36.7 °C)] 97.1 °F (36.2 °C)  Heart Rate:  [74-90] 74  Resp:  [17-18] 17  BP: ()/(58-69) 100/58      General:    alert, appears stated age and cooperative   Abdomen:  Soft, Non-tender    Lochia:  appropriate   Uterine Fundus:   firm   Ext    Edema 1+   DVT Evaluation:  No evidence of DVT seen on physical exam.     Lab Results   Component Value Date    WBC 12.66 (H) 09/11/2021    HGB 8.8 (L) 09/11/2021    HCT 26.0 (L) 09/11/2021    MCV 84.7 09/11/2021     09/11/2021       Ray Rojas MD  9/12/2021  09:25 EDT      Electronically signed by Ray Rojas MD at 09/12/21 0926     Yudi Howe MD at 09/11/21 1001          Postpartum Progress Note      Status post Vaginal Delivery: Doing well postoperatively.     1) postpartum care immediately following delivery : Continue routine postpartum care.  2) PPH--no concerning ongoing bleeding.  She denies symptoms of anemia.  CBC pending.    Rh status: O pos  Rubella: Immune  Gender: male--desires circumcision.  R/B/A d/w mom and she agrees to proceed.    Subjective     Postpartum Day 1: Vaginal delivery    The patient feels well. The patient denies emotional concerns. Pain is well controlled with current medications. The baby is well. The patient is ambulating well. The patient is tolerating a normal diet.     Objective     Vital signs in last 24 hours:  Temp:  [97.7 °F (36.5 °C)-99.5 °F (37.5 °C)] 97.7 °F (36.5 °C)  Heart Rate:  [] 97  Resp:  [16-18] 16  BP: ()/(40-74) 90/59      General:    alert, appears stated age and cooperative   Abdomen:  Soft, Non-tender    Lochia:  appropriate   Uterine Fundus:   firm   Ext    Edema trace   DVT Evaluation:  No evidence of DVT seen on physical exam.     Lab Results   Component Value Date    WBC 12.06 (H) 09/10/2021    HGB 11.2 (L) 09/10/2021    HCT 33.0 (L) 09/10/2021    MCV 85.7 09/10/2021      09/10/2021       Yudi Howe MD  2021  10:04 EDT      Electronically signed by Yudi Howe MD at 21 1006       Consult Notes (last 7 days) (Notes from 21 through 21)    No notes of this type exist for this encounter.            Discharge Summary      Ray Rojas MD at 21 0929            Date of Discharge:  2021    Discharge Diagnosis: postpartum care immediately after delivery    Presenting Problem/History of Present Illness  Pregnancy [Z34.90]       Hospital Course  Patient is a 21 y.o. female  39w3d presented with labor.  For events surrounding her delivery please see delivery/op note.  Her postpartum course was uneventful and today PPD#2, she is ready for discharge.  She meets all milestones and criteria for discharge and instructions were reviewed and she voiced understanding.     Procedures Performed  Vaginal delivery        Consults:   Consults     No orders found from 2021 to 9/10/2021.          Pertinent Test Results: Hg 8.8 grams     Condition on Discharge:  Stable     Discharge Disposition  Home or Self Care    Discharge Medications     Discharge Medications      New Medications      Instructions Start Date   ferrous sulfate 325 (65 FE) MG tablet   325 mg, Oral, Daily With Breakfast      HYDROcodone-acetaminophen 5-325 MG per tablet  Commonly known as: NORCO   1 tablet, Oral, Every 6 Hours PRN      ibuprofen 800 MG tablet  Commonly known as: ADVIL,MOTRIN   800 mg, Oral, Every 8 Hours PRN         Continue These Medications      Instructions Start Date   Prenatal 28-0.8 MG tablet   Oral             Discharge Diet:   Diet Instructions     Advance Diet As Tolerated            Activity at Discharge:   Activity Instructions     Pelvic Rest            Follow-up Appointments  Future Appointments   Date Time Provider Department Center   2021  2:00 PM  [facility] [place] Y[facility] RICHARDSON [facility]   2021  2:30 PM Ray Rojas MD [facility]  [facility]     Additional Instructions for the Follow-ups that You Need to Schedule     Discharge Follow-up with Specialty: Dr. Rojas; 6 Weeks   As directed      Specialty: Dr. Rojas    Follow Up: 6 Weeks               Test Results Pending at Discharge       Ray Rojas MD  09/12/21  09:29 EDT                Electronically signed by Ray Rojas MD at 09/12/21 0930

## 2021-09-17 NOTE — PAYOR COMM NOTE
"Harleen Purdy (21 y.o. Female)     [facility]  [address] [place] [address]  Facility NPI: [id number]  Elmo Jolly  Fax: [phone]  Phone: [phone] (Sarah: [phone])  Subject: DC SUMM  Reference #: [id number]  Please don't hesitate to contact me with any questions or concerns.        Date of Birth Social Security Number Address Home Phone MRN    [id number]  [id number]   [facility] [phone] [id number]    [employer] Marital Status           Single       Admission Date Admission Type Admitting Provider Attending Provider Department, Room/Bed    21 Emergency Ken Koch MD  [facility] 3 [facility], S321/1    Discharge Date Discharge Disposition Discharge Destination        2021 Home or Self Care              Attending Provider: (none)   Allergies: No Known Allergies    Isolation: None   Infection: None   Code Status: Prior    Ht: 167.6 cm (66\")   Wt: 70.8 kg (156 lb)    Admission Cmt: None   Principal Problem: Normal vaginal delivery [O80]                 Active Insurance as of 2021     Primary Coverage     Payor Plan Insurance Group Employer/Plan Group    [employer] [place] [employer]ICA[place]      Payor Plan Address Payor Plan Phone Number Payor Plan Fax Number Effective Dates    [address] [address] [phone]  2021 - None Entered    [place] [address]       Subscriber Name Subscriber Birth Date Member ID       HARLEEN PURDY 2000 [id number]                 Emergency Contacts      (Rel.) Home Phone Work Phone Mobile Phone    harvey levin (Significant Other) -- -- [phone]               History & Physical      ScobeeRay MD at 09/10/21 [address] [place] [address]  Obstetric History and Physical    Chief Complaint   Patient presents with   • Rupture of Membranes     JUDY. Noticed trickle of fluid around 2200. +FM. Denies vaginal bleeding. Occasional CTX           Patient is a 21 y.o. female  currently at " 39w3d, who presents with amniorrhexis/labor.    Her prenatal care was with Dr. Rojas and was complicated by transfer in at term (Moved from [place], [place]).        External Prenatal Results     Pregnancy Outside Results - Transcribed From Office Records - See Scanned Records For Details     Test Value Date Time    ABO  O  09/10/21 0121    Rh  Positive  09/10/21 0121    Antibody Screen  Negative  09/10/21 0121      ^ Normal  21     Varicella IgG       Rubella ^ immune  21     Hgb  11.2 g/dL 09/10/21 012      ^ 12.4 g/dL 21     Hct  33.0 % 09/10/21 012      ^ 36 % 21     Glucose Fasting GTT       Glucose Tolerance Test 1 hour ^ 135  21     Glucose Tolerance Test 3 hour       Gonorrhea (discrete)       Chlamydia (discrete)       RPR ^ Non-Reactive  21     VDRL       Syphilis Antibody       HBsAg ^ Negative  21     Herpes Simplex Virus PCR       Herpes Simplex VIrus Culture       HIV ^ neg  21     Hep C RNA Quant PCR       Hep C Antibody       AFP       Group B Strep ^ negative  21     GBS Susceptibility to Clindamycin       GBS Susceptibility to Erythromycin       Fetal Fibronectin       Genetic Testing, Maternal Blood             Drug Screening     Test Value Date Time    Urine Drug Screen       Amphetamine Screen       Barbiturate Screen       Benzodiazepine Screen       Methadone Screen       Phencyclidine Screen       Opiates Screen       THC Screen       Cocaine Screen       Propoxyphene Screen       Buprenorphine Screen       Methamphetamine Screen       Oxycodone Screen       Tricyclic Antidepressants Screen             Legend    ^: Historical                           OB History    Para Term  AB Living   1 0 0 0 0 0   SAB TAB Ectopic Molar Multiple Live Births   0 0 0 0 0 0      # Outcome Date GA Lbr Lorenzo/2nd Weight Sex Delivery Anes PTL Lv   1 Current                  History reviewed. No pertinent past medical history.       Past Surgical  History:   Procedure Laterality Date   • LEG SURGERY            No current facility-administered medications on file prior to encounter.     Current Outpatient Medications on File Prior to Encounter   Medication Sig Dispense Refill   • Prenatal 28-0.8 MG tablet Take  by mouth.          No Known Allergies       Social History     Socioeconomic History   • Marital status: Single     Spouse name: Not on file   • Number of children: Not on file   • Years of education: Not on file   • Highest education level: Not on file   Tobacco Use   • Smoking status: Former Smoker     Types: Cigarettes   • Smokeless tobacco: Never Used   Vaping Use   • Vaping Use: Never used   Substance and Sexual Activity   • Alcohol use: Not Currently   • Drug use: Never   • Sexual activity: Yes     Partners: Male          Family History   Problem Relation Age of Onset   • No Known Problems Father    • No Known Problems Mother    • Breast cancer Neg Hx    • Ovarian cancer Neg Hx    • Uterine cancer Neg Hx    • Colon cancer Neg Hx    • Deep vein thrombosis Neg Hx    • Pulmonary embolism Neg Hx         Review of Systems   Constitutional: Negative for chills and fever.   Eyes: Negative for visual disturbance.   Gastrointestinal: Negative for abdominal pain, constipation and diarrhea.   Genitourinary: Positive for pelvic pain and vaginal discharge. Negative for vaginal bleeding.   Neurological: Negative for headache.   All other systems reviewed and are negative.      Temp:  [97.5 °F (36.4 °C)-98.8 °F (37.1 °C)] 97.8 °F (36.6 °C)  Heart Rate:  [] 96  Resp:  [16-18] 18  BP: ()/(38-84) 91/49      Physical Exam  Vitals reviewed. Exam conducted with a chaperone present.   Constitutional:       General: She is not in acute distress.     Appearance: She is well-developed.   HENT:      Head: Normocephalic and atraumatic.   Eyes:      General:         Right eye: No discharge.         Left eye: No discharge.      Conjunctiva/sclera: Conjunctivae  normal.   Neck:      Thyroid: No thyromegaly.   Cardiovascular:      Rate and Rhythm: Normal rate and regular rhythm.      Heart sounds: Normal heart sounds. No murmur heard.     Pulmonary:      Effort: Pulmonary effort is normal. No respiratory distress.      Breath sounds: Normal breath sounds.   Abdominal:      General: There is distension (EFW 7# ).      Palpations: Abdomen is soft.      Tenderness: There is no abdominal tenderness.   Genitourinary:     Labia:         Right: No lesion.         Left: No lesion.       Cervix: No cervical bleeding (Cx 4/80/-2, IUPC placed ).      Uterus: Enlarged (Gravid ).    Musculoskeletal:         General: Normal range of motion.      Cervical back: Normal range of motion and neck supple.      Right lower leg: Edema (trace ) present.      Left lower leg: Edema present.   Lymphadenopathy:      Cervical: No cervical adenopathy.   Skin:     General: Skin is warm and dry.      Findings: No rash.   Neurological:      Mental Status: She is alert and oriented to person, place, and time.      Deep Tendon Reflexes: Reflexes normal.   Psychiatric:         Behavior: Behavior normal.         Thought Content: Thought content normal.         Judgment: Judgment normal.           FHT - Class 2, intermittent to regular variable decelerations with contractions.   [place] - q 3 min     Lab Results   Component Value Date    WBC 12.06 (H) 09/10/2021    HGB 11.2 (L) 09/10/2021    HCT 33.0 (L) 09/10/2021    MCV 85.7 09/10/2021     09/10/2021         Pregnancy      Assessment:  1.  Intrauterine pregnancy at 39w3d weeks gestation with variable decelerations present fetal status.    2.  labor  with ROM  3.  Obstetrical history uncomplicated  4.  GBS status: .Negative     Plan:  1. Vaginal anticipated, fetal and uterine monitoring  continuously, labor augmentation  Pitocin and analgesia with  epidural  2. Plan of care has been reviewed with patient and significant other  Primary concern is  intermittent class II tracing with variable decelerations. Have worked on hypotension and IUPC with amnioinfusion, still appears mostly positional. Working to optimize tracing and allow labor.. Aware if unable to do this could have to proceed with Primary    3.  Risks, benefits of treatment plan have been discussed.  4.  All questions have been answered.        Ray Ye MD  9/10/2021  11:23 EDT    Electronically signed by Ray Ye MD at 09/10/21 1129       Discharge Order (From admission, onward)     Start     Ordered    21  Discharge patient  Once     Expected Discharge Date: 21    Discharge Disposition: Home or Self Care    Physician of Record for Attribution - Please select from Treatment Team: RAY YE [[id number]]    Review needed by CMO to determine Physician of Record: No       Question Answer Comment   Physician of Record for Attribution - Please select from Treatment Team RAY YE    Review needed by CMO to determine Physician of Record No        21 [id number]

## 2022-09-02 ENCOUNTER — INITIAL PRENATAL (OUTPATIENT)
Dept: OBSTETRICS AND GYNECOLOGY | Facility: CLINIC | Age: 22
End: 2022-09-02

## 2022-09-02 VITALS — SYSTOLIC BLOOD PRESSURE: 107 MMHG | WEIGHT: 132 LBS | BODY MASS INDEX: 21.31 KG/M2 | DIASTOLIC BLOOD PRESSURE: 65 MMHG

## 2022-09-02 DIAGNOSIS — O21.9 NAUSEA AND VOMITING IN PREGNANCY: ICD-10-CM

## 2022-09-02 DIAGNOSIS — Z11.3 SCREEN FOR STD (SEXUALLY TRANSMITTED DISEASE): ICD-10-CM

## 2022-09-02 DIAGNOSIS — Z34.91 UNCERTAIN DATES, ANTEPARTUM, FIRST TRIMESTER: ICD-10-CM

## 2022-09-02 DIAGNOSIS — Z34.81 ENCOUNTER FOR SUPERVISION OF OTHER NORMAL PREGNANCY IN FIRST TRIMESTER: Primary | ICD-10-CM

## 2022-09-02 DIAGNOSIS — Z12.4 SCREENING FOR CERVICAL CANCER: ICD-10-CM

## 2022-09-02 LAB
B-HCG UR QL: POSITIVE
EXPIRATION DATE: ABNORMAL
GLUCOSE UR STRIP-MCNC: NEGATIVE MG/DL
INTERNAL NEGATIVE CONTROL: NEGATIVE
INTERNAL POSITIVE CONTROL: POSITIVE
Lab: ABNORMAL
PROT UR STRIP-MCNC: ABNORMAL MG/DL

## 2022-09-02 PROCEDURE — 99214 OFFICE O/P EST MOD 30 MIN: CPT | Performed by: OBSTETRICS & GYNECOLOGY

## 2022-09-02 PROCEDURE — 81025 URINE PREGNANCY TEST: CPT | Performed by: OBSTETRICS & GYNECOLOGY

## 2022-09-02 RX ORDER — PROMETHAZINE HYDROCHLORIDE 25 MG/1
25 TABLET ORAL EVERY 6 HOURS PRN
Qty: 30 TABLET | Refills: 2 | Status: SHIPPED | OUTPATIENT
Start: 2022-09-02 | End: 2023-03-31 | Stop reason: HOSPADM

## 2022-09-02 RX ORDER — SWAB
1 SWAB, NON-MEDICATED MISCELLANEOUS DAILY
Qty: 90 EACH | Refills: 3 | Status: SHIPPED | OUTPATIENT
Start: 2022-09-02

## 2022-09-02 NOTE — PROGRESS NOTES
Initial ob visit     CC- Here for care of pregnancy     Harleen Purdy is being seen today for her first obstetrical visit.  She is a 22 y.o.    9w2d gestation.     # 1 - Date: 09/10/21, Sex: Male, Weight: 3426 g (7 lb 8.9 oz), GA: 39w3d, Delivery: Vaginal, Spontaneous, Apgar1: 8, Apgar5: 9, Living: Living, Birth Comments: scale 1    # 2 - Date: None, Sex: None, Weight: None, GA: None, Delivery: None, Apgar1: None, Apgar5: None, Living: None, Birth Comments: None      Current obstetric complaints : none  Duration/severity of complaints:   Initial positive test date : 2022     Location : @ home    Prior obstetric issues, potential pregnancy concerns: none  Family history of genetic issues (includes FOB): none  Prior infections concerning in pregnancy (Rash, fever in last 2 weeks): none  Varicella Hx -negative history  Prior testing for Cystic Fibrosis Carrier or Sickle Cell Trait- negative status  Prepregnancy BMI - Body mass index is 21.31 kg/m².  Hx of HSV for patient or partner : No     History reviewed. No pertinent past medical history.    Past Surgical History:   Procedure Laterality Date   • LEG SURGERY           Current Outpatient Medications:   •  Prenatal 28-0.8 MG tablet, Take 1 tablet by mouth Daily. Please use formulary or generic, with DHA ideal, Disp: 90 each, Rfl: 3  •  promethazine (PHENERGAN) 25 MG tablet, Take 1 tablet by mouth Every 6 (Six) Hours As Needed for Nausea or Vomiting., Disp: 30 tablet, Rfl: 2    No Known Allergies    Social History     Socioeconomic History   • Marital status: Single   Tobacco Use   • Smoking status: Former Smoker     Types: Cigarettes   • Smokeless tobacco: Never Used   Vaping Use   • Vaping Use: Never used   Substance and Sexual Activity   • Alcohol use: Not Currently   • Drug use: Never   • Sexual activity: Yes     Partners: Male       Family History   Problem Relation Age of Onset   • No Known Problems Father    • No Known Problems Mother    • Breast  cancer Neg Hx    • Ovarian cancer Neg Hx    • Uterine cancer Neg Hx    • Colon cancer Neg Hx    • Deep vein thrombosis Neg Hx    • Pulmonary embolism Neg Hx        Review of systems     Constitutional : Nausea, fatigue nausea mild, fatigue present    : Vaginal bleeding, cramping no bleeding, no cramping   Breast Tenderness : yes   All other systems reviewed and negative        Objective    /65   Wt 59.9 kg (132 lb)   LMP 06/29/2022 (Approximate)   BMI 21.31 kg/m²       General Appearance:    Alert, cooperative, in no acute distress, habitus normal    Head:    Normocephalic, without obvious abnormality, atraumatic   Eyes:            Lids and lashes normal, conjunctivae and sclerae normal, no   icterus, no pallor, corneas clear   Ears:    Ears appear intact with no abnormalities noted       Neck:   No adenopathy, supple, trachea midline, no thyromegaly   Back:     No kyphosis present, no scoliosis present,                       Lungs:     Clear to auscultation,respirations regular, even and     unlabored    Heart:    Regular rhythm and normal rate, normal S1 and S2, no            murmur, no gallop, no rub, no click   Breast Exam:    No masses, No nipple discharge   Abdomen:     Normal bowel sounds, no masses, no organomegaly, soft        non-tender, non-distended, no guarding, no rebound                 tenderness   Genitalia:    Vulva - BUS-WNL, NEFG    Vagina - No discharge, No bleeding    Cervix - No Lesions, closed     Uterus - Consistent with 10-12 weeks, anteverted     Adnexa - No mass, NT    Pelvimetry - clinically adequate, gynecoid pelvis     Extremities:   Moves all extremities well, no edema, no cyanosis, no              redness   Pulses:   Pulses palpable and equal bilaterally   Skin:   No bleeding, bruising or rash   Lymph nodes:   No palpable adenopathy   Neurologic:   Sensation intact, A&O times 3        Brief Urine Lab Results  (Last result in the past 365 days)      Color   Clarity   Blood    Leuk Est   Nitrite   Protein   CREAT   Urine HCG        09/02/22 1310               Positive              Assessment & Plan     Diagnoses and all orders for this visit:    1. Encounter for supervision of other normal pregnancy in first trimester (Primary)  -     OB Panel With HIV  -     Urine Culture - , Urine, Clean Catch    2. Screening for cervical cancer  -     POC Pregnancy, Urine  -     IGP, Rfx Aptima HPV ASCU    3. Screen for STD (sexually transmitted disease)  -     Chlamydia trachomatis, Neisseria gonorrhoeae, Trichomonas vaginalis, PCR - Swab, Vagina    4. Nausea and vomiting in pregnancy    5. Uncertain dates, antepartum, first trimester  -     US Ob Transvaginal    Other orders  -     promethazine (PHENERGAN) 25 MG tablet; Take 1 tablet by mouth Every 6 (Six) Hours As Needed for Nausea or Vomiting.  Dispense: 30 tablet; Refill: 2  -     Prenatal 28-0.8 MG tablet; Take 1 tablet by mouth Daily. Please use formulary or generic, with DHA ideal  Dispense: 90 each; Refill: 3        1) Pregnancy at 9w2d  2) Uncertain dates  Check ultrasound   3) Nausea  Trial of phenergan PRN          Activity recommendation : 150 minutes/week of moderate intensity aerobic activity unless we limit for bleeding, hypertension or other pregnancy complication   Patient is on Prenatal vitamins  Problem list reviewed and updated.  Reviewed routine prenatal care with the office to include but not limited to   General pregnancy recommendations reviewed including but not limited to not changing cat litter, food restrictions, avoidance of alcohol, tobacco and drugs and saunas/hot tubs.   All questions answered.     Ray Rojas MD   9/2/2022  13:27 EDT

## 2022-09-03 LAB
ABO GROUP BLD: NORMAL
BASOPHILS # BLD AUTO: 0 X10E3/UL (ref 0–0.2)
BASOPHILS NFR BLD AUTO: 0 %
BLD GP AB SCN SERPL QL: NEGATIVE
EOSINOPHIL # BLD AUTO: 0.1 X10E3/UL (ref 0–0.4)
EOSINOPHIL NFR BLD AUTO: 1 %
ERYTHROCYTE [DISTWIDTH] IN BLOOD BY AUTOMATED COUNT: 11.7 % (ref 11.7–15.4)
HBV SURFACE AG SERPL QL IA: NEGATIVE
HCT VFR BLD AUTO: 38.5 % (ref 34–46.6)
HCV AB S/CO SERPL IA: <0.1 S/CO RATIO (ref 0–0.9)
HGB BLD-MCNC: 13.3 G/DL (ref 11.1–15.9)
HIV 1+2 AB+HIV1 P24 AG SERPL QL IA: NON REACTIVE
IMM GRANULOCYTES # BLD AUTO: 0 X10E3/UL (ref 0–0.1)
IMM GRANULOCYTES NFR BLD AUTO: 0 %
LYMPHOCYTES # BLD AUTO: 1.5 X10E3/UL (ref 0.7–3.1)
LYMPHOCYTES NFR BLD AUTO: 27 %
MCH RBC QN AUTO: 30.9 PG (ref 26.6–33)
MCHC RBC AUTO-ENTMCNC: 34.5 G/DL (ref 31.5–35.7)
MCV RBC AUTO: 90 FL (ref 79–97)
MONOCYTES # BLD AUTO: 0.5 X10E3/UL (ref 0.1–0.9)
MONOCYTES NFR BLD AUTO: 9 %
NEUTROPHILS # BLD AUTO: 3.6 X10E3/UL (ref 1.4–7)
NEUTROPHILS NFR BLD AUTO: 63 %
PLATELET # BLD AUTO: 245 X10E3/UL (ref 150–450)
RBC # BLD AUTO: 4.3 X10E6/UL (ref 3.77–5.28)
RH BLD: POSITIVE
RPR SER QL: NON REACTIVE
RUBV IGG SERPL IA-ACNC: 3.27 INDEX
WBC # BLD AUTO: 5.8 X10E3/UL (ref 3.4–10.8)

## 2022-09-04 LAB
BACTERIA UR CULT: NORMAL
BACTERIA UR CULT: NORMAL
ONE SPECIMEN IDENTIFIER: NORMAL

## 2022-09-09 LAB
C TRACH RRNA SPEC QL NAA+PROBE: NEGATIVE
CONV .: NORMAL
CYTOLOGIST CVX/VAG CYTO: NORMAL
CYTOLOGY CVX/VAG DOC CYTO: NORMAL
DX ICD CODE: NORMAL
HIV 1 & 2 AB SER-IMP: NORMAL
N GONORRHOEA RRNA SPEC QL NAA+PROBE: NEGATIVE
OTHER STN SPEC: NORMAL
STAT OF ADQ CVX/VAG CYTO-IMP: NORMAL
T VAGINALIS RRNA SPEC QL NAA+PROBE: NEGATIVE

## 2022-09-12 ENCOUNTER — TELEPHONE (OUTPATIENT)
Dept: OBSTETRICS AND GYNECOLOGY | Facility: CLINIC | Age: 22
End: 2022-09-12

## 2022-09-12 NOTE — TELEPHONE ENCOUNTER
----- Message from Ray Rojas MD sent at 9/12/2022 11:07 AM EDT -----  Bailey, please let her know the STD screen for Chlamydia, Gonorrhea and trichomoniasis is negative. Pap also normal. Thanks, Dr. Rojas

## 2022-10-05 ENCOUNTER — ROUTINE PRENATAL (OUTPATIENT)
Dept: OBSTETRICS AND GYNECOLOGY | Facility: CLINIC | Age: 22
End: 2022-10-05

## 2022-10-05 VITALS — DIASTOLIC BLOOD PRESSURE: 67 MMHG | BODY MASS INDEX: 22.11 KG/M2 | SYSTOLIC BLOOD PRESSURE: 118 MMHG | WEIGHT: 137 LBS

## 2022-10-05 DIAGNOSIS — Z13.79 ENCOUNTER FOR GENETIC SCREENING FOR DOWN SYNDROME: ICD-10-CM

## 2022-10-05 DIAGNOSIS — Z36.89 ENCOUNTER FOR FETAL ANATOMIC SURVEY: ICD-10-CM

## 2022-10-05 DIAGNOSIS — Z34.82 ENCOUNTER FOR SUPERVISION OF OTHER NORMAL PREGNANCY IN SECOND TRIMESTER: Primary | ICD-10-CM

## 2022-10-05 LAB
GLUCOSE UR STRIP-MCNC: NEGATIVE MG/DL
PROT UR STRIP-MCNC: NEGATIVE MG/DL

## 2022-10-05 PROCEDURE — 99213 OFFICE O/P EST LOW 20 MIN: CPT | Performed by: OBSTETRICS & GYNECOLOGY

## 2022-10-05 NOTE — PROGRESS NOTES
OB follow up     Harleen Purdy is a 22 y.o.  14w0d being seen today for her obstetrical visit.  Patient reports no complaints. Fetal movement: too early .    Her prenatal care is complicated by (and status): uncomplicated     Review of Systems  Cramping/contractions : none   Vaginal bleeding: none   Fetal movement too early     /67   Wt 62.1 kg (137 lb)   LMP 2022 (Approximate)   BMI 22.11 kg/m²     FHT: 156 BPM   Uterine Size: 14 cm       Assessment    Diagnoses and all orders for this visit:    1. Encounter for supervision of other normal pregnancy in second trimester (Primary)  -     POC Urinalysis Dipstick    2. Encounter for genetic screening for Down Syndrome  -     [id number] PLUS Core+SCA - Blood,    3. Encounter for fetal anatomic survey  -     US Ob 14 + Weeks Single or First Gestation; Future        1) pregnancy at 14w0d   Labs, cultures, pap and ultrasound reviewed.   Essentially all normal   Quickening reviewed  NIPT for trisomy screening   Flu shot recommended  Anatomy scan next visit.         Plan    Reviewed this stage of pregnancy  Problem list updated   Follow up in 6 weeks    Ray Rojas MD   10/5/2022  10:55 EDT

## 2022-10-10 LAB
CFDNA.FET/CFDNA.TOTAL SFR FETUS: NORMAL %
CITATION REF LAB TEST: NORMAL
FET 13+18+21+X+Y ANEUP PLAS.CFDNA: NEGATIVE
FET CHR 21 TS PLAS.CFDNA QL: NEGATIVE
FET MS X RISK WBC.DNA+CFDNA QL: NOT DETECTED
FET SEX PLAS.CFDNA DOSAGE CFDNA: NORMAL
FET TS 13 RISK PLAS.CFDNA QL: NEGATIVE
FET TS 18 RISK WBC.DNA+CFDNA QL: NEGATIVE
FET X + Y ANEUP RISK PLAS.CFDNA SEQ-IMP: NOT DETECTED
GA EST FROM CONCEPTION DATE: NORMAL D
GESTATIONAL AGE > 9:: YES
LAB DIRECTOR NAME PROVIDER: NORMAL
LAB DIRECTOR NAME PROVIDER: NORMAL
LABORATORY COMMENT REPORT: NORMAL
LIMITATIONS OF THE TEST: NORMAL
NEGATIVE PREDICTIVE VALUE: NORMAL
NOTE: NORMAL
PERFORMANCE CHARACTERISTICS: NORMAL
POSITIVE PREDICTIVE VALUE: NORMAL
REF LAB TEST METHOD: NORMAL
TEST PERFORMANCE INFO SPEC: NORMAL

## 2022-10-11 ENCOUNTER — TELEPHONE (OUTPATIENT)
Dept: OBSTETRICS AND GYNECOLOGY | Facility: CLINIC | Age: 22
End: 2022-10-11

## 2022-10-11 NOTE — TELEPHONE ENCOUNTER
----- Message from Bailey López MA sent at 10/10/2022  5:25 PM EDT -----  L/m for pt/daron  ----- Message -----  From: Ray Rojas MD  Sent: 10/10/2022   8:13 AM EDT  To: CALLIE Ramirez, let her know the down syndrome  Test was negative/Normal. Infant appears male. Thanks Dr. Rojas

## 2022-11-23 ENCOUNTER — ROUTINE PRENATAL (OUTPATIENT)
Dept: OBSTETRICS AND GYNECOLOGY | Facility: CLINIC | Age: 22
End: 2022-11-23

## 2022-11-23 VITALS — BODY MASS INDEX: 22.11 KG/M2 | DIASTOLIC BLOOD PRESSURE: 66 MMHG | SYSTOLIC BLOOD PRESSURE: 104 MMHG | WEIGHT: 137 LBS

## 2022-11-23 DIAGNOSIS — Z34.82 ENCOUNTER FOR SUPERVISION OF OTHER NORMAL PREGNANCY IN SECOND TRIMESTER: Primary | ICD-10-CM

## 2022-11-23 LAB
GLUCOSE UR STRIP-MCNC: NEGATIVE MG/DL
PROT UR STRIP-MCNC: ABNORMAL MG/DL

## 2022-11-23 PROCEDURE — 99212 OFFICE O/P EST SF 10 MIN: CPT | Performed by: OBSTETRICS & GYNECOLOGY

## 2022-11-23 NOTE — PROGRESS NOTES
OB follow up     Harleen Purdy is a 22 y.o.  21w0d being seen today for her obstetrical visit.  Patient reports no complaints. Fetal movement: normal.    Her prenatal care is complicated by (and status): uncomplicated     Review of Systems  Cramping/contractions : none   Vaginal bleeding: none   Fetal movement good     /66   Wt 62.1 kg (137 lb)   LMP 2022 (Approximate)   BMI 22.11 kg/m²     FHT: 146 BPM   Uterine Size: 19 cm       Assessment    There are no diagnoses linked to this encounter.    1) pregnancy at 21w0d   Anatomy ultrasound reviewed   Essentially normal           Plan    Reviewed this stage of pregnancy  Problem list updated   Follow up in 4 weeks    Ray Rojas MD   2022  13:52 EST

## 2023-01-16 ENCOUNTER — REFERRAL TRIAGE (OUTPATIENT)
Dept: LABOR AND DELIVERY | Facility: HOSPITAL | Age: 23
End: 2023-01-16
Payer: COMMERCIAL

## 2023-01-17 ENCOUNTER — PATIENT OUTREACH (OUTPATIENT)
Dept: LABOR AND DELIVERY | Facility: HOSPITAL | Age: 23
End: 2023-01-17
Payer: COMMERCIAL

## 2023-01-17 NOTE — OUTREACH NOTE
[facility]  Unable to Reach       Questions/Answers    Flowsheet Row Responses   Pending Outreach Confirm Patient Interest   Call Attempt First   Outcome Left message   Next Call Attempt Date 01/19/23          Rosita Smith RN  Maternity Nurse Navigator    1/17/2023, 15:05 EST

## 2023-01-19 ENCOUNTER — PATIENT OUTREACH (OUTPATIENT)
Dept: LABOR AND DELIVERY | Facility: HOSPITAL | Age: 23
End: 2023-01-19
Payer: COMMERCIAL

## 2023-01-19 NOTE — OUTREACH NOTE
[facility]  Enrollment    Current Estimated Gestational Age: 29w1d    Questions/Answers    Flowsheet Row Responses   Would like to participate? Yes   Date of Intake Visit 01/19/23        [facility]  Intake    Current Estimated Gestational Age: 29w1d    Intake Assessment    Flowsheet Row Responses   Best Method for Contacting Cell   Currently Employed Yes  [ ]   Able to keep appointments as scheduled Yes   Gender(s) and Name(s) boy   Do you have a dentist? No   Resources Presently Utilizing: WIC (Women, Infant, Children)   Maternal Warning Signs Provided   Other Education How to find a dentist, How to find a primary care provider, HANDS, Insurance benefits/Incentives, WIC Benefits          Learning Assessment    Flowsheet Row Responses   Relationship Patient   Learner Name Harleen   Does the learner have any barriers to learning? No Barriers   Is an  required? No   How does the learner prefer to learn new concepts? Listening, Reading, Demonstration, Pictures/Video        [facility]  Check-In    Current Estimated Gestational Age: 29w1d    Questions/Answers    Flowsheet Row Responses   Best Method for Contacting Cell   Demographics Reviewed Yes   Currently Employed Yes  [ ]   Able to keep appointments as scheduled Yes   Gender(s) and Name(s) boy   Baby Active/Feeling Fetal Movemen Yes   How are you presently feeling? pretty good   Supplies ready for baby Clothing, Crib   Resource/Environmental Concerns None   Do you have any questions related to your care experience, your pregnancy, plans for delivery, any concerns, etc? No   Other Education How to find a dentist, How to find a primary care provider, HANDS, Insurance benefits/Incentives, WIC Benefits        Intake completed. Patient states that she is doing well with the pregnancy and denies major resource needs. She works full time and lives alone with her son. She has a crib and clothing for infant and will  call insurance about car seat, breast pump, and other benefits. Reviewed maternal warning signs and fetal kick counts.She is planning to try NCB as she did not like the feeling of the epidural with her last delivery, NCB options at [facility] reviewed. F/u in one month.     Rosita Smith, RN  Maternity Nurse Navigator    1/19/2023, 13:14 EST

## 2023-01-23 ENCOUNTER — ROUTINE PRENATAL (OUTPATIENT)
Dept: OBSTETRICS AND GYNECOLOGY | Facility: CLINIC | Age: 23
End: 2023-01-23
Payer: COMMERCIAL

## 2023-01-23 VITALS — WEIGHT: 143 LBS | SYSTOLIC BLOOD PRESSURE: 116 MMHG | BODY MASS INDEX: 23.08 KG/M2 | DIASTOLIC BLOOD PRESSURE: 71 MMHG

## 2023-01-23 DIAGNOSIS — O26.843 FUNDAL HEIGHT LOW FOR DATES IN THIRD TRIMESTER: ICD-10-CM

## 2023-01-23 DIAGNOSIS — Z36.9 ANTENATAL SCREENING ENCOUNTER: ICD-10-CM

## 2023-01-23 DIAGNOSIS — Z34.83 ENCOUNTER FOR SUPERVISION OF OTHER NORMAL PREGNANCY IN THIRD TRIMESTER: Primary | ICD-10-CM

## 2023-01-23 LAB
GLUCOSE UR STRIP-MCNC: NEGATIVE MG/DL
PROT UR STRIP-MCNC: ABNORMAL MG/DL

## 2023-01-23 PROCEDURE — 99213 OFFICE O/P EST LOW 20 MIN: CPT | Performed by: OBSTETRICS & GYNECOLOGY

## 2023-01-23 NOTE — PROGRESS NOTES
OB follow up     Harleen Purdy is a 22 y.o.  29w5d being seen today for her obstetrical visit.  Patient reports no complaints. Fetal movement: normal.    Her prenatal care is complicated by (and status): uncomplicated    Review of Systems  Cramping/contractions : none   Vaginal bleeding: none   Fetal movement good     /71   Wt 64.9 kg (143 lb)   LMP 2022 (Approximate)   BMI 23.08 kg/m²     FHT: 134 BPM   Uterine Size: 26 cm       Assessment    Diagnoses and all orders for this visit:    1. Encounter for supervision of other normal pregnancy in third trimester (Primary)    2.  screening encounter  -     Gestational Screen 1 Hr ([facility])  -     CBC & Differential    3. Fundal height low for dates in third trimester  -     US Ob Follow Up Transabdominal Approach        1) pregnancy at 29w5d   Missed last visit, long time to reschedule  Rh+, GTT/CBC ordered  Peds, prenatal classes and tours  TDaP and flu recommended  Encouraged questions about L&D, anesthesia, breast feeding and birth control   2) FH low   Check growth next visit.         Plan    Reviewed this stage of pregnancy  Problem list updated   Follow up in 2 weeks    Ray Rojas MD   2023  14:16 EST

## 2023-02-08 ENCOUNTER — ROUTINE PRENATAL (OUTPATIENT)
Dept: OBSTETRICS AND GYNECOLOGY | Facility: CLINIC | Age: 23
End: 2023-02-08
Payer: COMMERCIAL

## 2023-02-08 VITALS — SYSTOLIC BLOOD PRESSURE: 104 MMHG | BODY MASS INDEX: 23.89 KG/M2 | DIASTOLIC BLOOD PRESSURE: 69 MMHG | WEIGHT: 148 LBS

## 2023-02-08 DIAGNOSIS — Z34.83 ENCOUNTER FOR SUPERVISION OF OTHER NORMAL PREGNANCY IN THIRD TRIMESTER: Primary | ICD-10-CM

## 2023-02-08 DIAGNOSIS — O26.843 FUNDAL HEIGHT LOW FOR DATES IN THIRD TRIMESTER: ICD-10-CM

## 2023-02-08 LAB
GLUCOSE UR STRIP-MCNC: NEGATIVE MG/DL
PROT UR STRIP-MCNC: ABNORMAL MG/DL

## 2023-02-08 PROCEDURE — 99213 OFFICE O/P EST LOW 20 MIN: CPT | Performed by: OBSTETRICS & GYNECOLOGY

## 2023-02-08 NOTE — PROGRESS NOTES
OB follow up     Harleen Purdy is a 22 y.o.  32w0d being seen today for her obstetrical visit.  Patient reports no complaints. Fetal movement: normal.    Her prenatal care is complicated by (and status): uncomplicated     Review of Systems  Cramping/contractions : none   Vaginal bleeding: none   Fetal movement good     /69   Wt 67.1 kg (148 lb)   LMP 2022 (Approximate)   BMI 23.89 kg/m²     FHT: 130 BPM   Uterine Size: 28 cm       Assessment    Diagnoses and all orders for this visit:    1. Encounter for supervision of other normal pregnancy in third trimester (Primary)    2. Fundal height low for dates in third trimester        1) pregnancy at 32w0d  Rh+, GTT/CBC today   Expectations reviewed.   Uncertain on contraception   2) FH low for dates  Growth AGA - Normal JEM and cephalic   Monitor PRN         Plan    Reviewed this stage of pregnancy  Problem list updated   Follow up in 2 weeks    Ray Rojas MD   2023  12:06 EST

## 2023-02-09 ENCOUNTER — TELEPHONE (OUTPATIENT)
Dept: OBSTETRICS AND GYNECOLOGY | Facility: CLINIC | Age: 23
End: 2023-02-09
Payer: COMMERCIAL

## 2023-02-09 LAB
BASOPHILS # BLD AUTO: 0.03 10*3/MM3 (ref 0–0.2)
BASOPHILS NFR BLD AUTO: 0.4 % (ref 0–1.5)
EOSINOPHIL # BLD AUTO: 0.07 10*3/MM3 (ref 0–0.4)
EOSINOPHIL NFR BLD AUTO: 0.9 % (ref 0.3–6.2)
ERYTHROCYTE [DISTWIDTH] IN BLOOD BY AUTOMATED COUNT: 12.4 % (ref 12.3–15.4)
GLUCOSE 1H P 50 G GLC PO SERPL-MCNC: 111 MG/DL (ref 65–139)
HCT VFR BLD AUTO: 32.4 % (ref 34–46.6)
HGB BLD-MCNC: 11.2 G/DL (ref 12–15.9)
IMM GRANULOCYTES # BLD AUTO: 0.03 10*3/MM3 (ref 0–0.05)
IMM GRANULOCYTES NFR BLD AUTO: 0.4 % (ref 0–0.5)
LYMPHOCYTES # BLD AUTO: 1.18 10*3/MM3 (ref 0.7–3.1)
LYMPHOCYTES NFR BLD AUTO: 14.8 % (ref 19.6–45.3)
MCH RBC QN AUTO: 31.9 PG (ref 26.6–33)
MCHC RBC AUTO-ENTMCNC: 34.6 G/DL (ref 31.5–35.7)
MCV RBC AUTO: 92.3 FL (ref 79–97)
MONOCYTES # BLD AUTO: 0.54 10*3/MM3 (ref 0.1–0.9)
MONOCYTES NFR BLD AUTO: 6.8 % (ref 5–12)
NEUTROPHILS # BLD AUTO: 6.13 10*3/MM3 (ref 1.7–7)
NEUTROPHILS NFR BLD AUTO: 76.7 % (ref 42.7–76)
NRBC BLD AUTO-RTO: 0 /100 WBC (ref 0–0.2)
PLATELET # BLD AUTO: 188 10*3/MM3 (ref 140–450)
RBC # BLD AUTO: 3.51 10*6/MM3 (ref 3.77–5.28)
WBC # BLD AUTO: 7.98 10*3/MM3 (ref 3.4–10.8)

## 2023-02-10 ENCOUNTER — TELEPHONE (OUTPATIENT)
Dept: OBSTETRICS AND GYNECOLOGY | Facility: CLINIC | Age: 23
End: 2023-02-10
Payer: COMMERCIAL

## 2023-02-10 NOTE — TELEPHONE ENCOUNTER
----- Message from Bailey López MA sent at 2/10/2023  1:18 PM EST -----  L/m for pt/daron  ----- Message -----  From: Bailey López MA  Sent: 2/9/2023  11:13 AM EST  To: Bailey López MA    L/m for pt/daron  ----- Message -----  From: Ray Rojas MD  Sent: 2/9/2023  11:01 AM EST  To: CALLIE Ramirez, Not anemic on her BS/CBC screen. Passed her glucose test. Please let her know. Thanks, Dr. Rojas

## 2023-02-10 NOTE — TELEPHONE ENCOUNTER
Pt returning call. Pt is aware. Olive told pt about lab results from 2/8. I told hub to inform her that it is concerning the results from 2/8 and it is just what Olive discussed with her per Dr. Rojas's notes on the lab.     Fransisco

## 2023-02-22 ENCOUNTER — ROUTINE PRENATAL (OUTPATIENT)
Dept: OBSTETRICS AND GYNECOLOGY | Facility: CLINIC | Age: 23
End: 2023-02-22
Payer: COMMERCIAL

## 2023-02-22 VITALS — WEIGHT: 150 LBS | BODY MASS INDEX: 24.21 KG/M2 | SYSTOLIC BLOOD PRESSURE: 113 MMHG | DIASTOLIC BLOOD PRESSURE: 70 MMHG

## 2023-02-22 DIAGNOSIS — Z34.83 ENCOUNTER FOR SUPERVISION OF OTHER NORMAL PREGNANCY IN THIRD TRIMESTER: Primary | ICD-10-CM

## 2023-02-22 DIAGNOSIS — O12.03 GESTATIONAL EDEMA IN THIRD TRIMESTER: ICD-10-CM

## 2023-02-22 LAB
GLUCOSE UR STRIP-MCNC: NEGATIVE MG/DL
PROT UR STRIP-MCNC: NEGATIVE MG/DL

## 2023-02-22 PROCEDURE — 99213 OFFICE O/P EST LOW 20 MIN: CPT | Performed by: OBSTETRICS & GYNECOLOGY

## 2023-02-22 NOTE — PROGRESS NOTES
OB follow up     Harleen Purdy is a 22 y.o.  34w0d being seen today for her obstetrical visit.  Patient reports no complaints. Fetal movement: normal.    Her prenatal care is complicated by (and status): uncomplicated     Review of Systems  Cramping/contractions : none   Vaginal bleeding: none   Fetal movement good     /70   Wt 68 kg (150 lb)   LMP 2022 (Approximate)   BMI 24.21 kg/m²     FHT: 154 BPM   Uterine Size: 31 cm       Assessment    Diagnoses and all orders for this visit:    1. Encounter for supervision of other normal pregnancy in third trimester (Primary)    2. Gestational edema in third trimester        1) pregnancy at 34w0d   FH low, growth AGA last visit.   2) Gestational edema   Complains of Lower extremity edema and pain  Suspect related to prior trauma  No evidence of DVT or Pre-eclampsia  Support garment recommended.         Plan    Reviewed this stage of pregnancy  Problem list updated   Follow up in 2 weeks    Ray Rojas MD   2023  10:24 EST

## 2023-02-25 ENCOUNTER — HOSPITAL ENCOUNTER (EMERGENCY)
Facility: HOSPITAL | Age: 23
Discharge: HOME | End: 2023-02-25
Attending: OBSTETRICS & GYNECOLOGY | Admitting: STUDENT IN AN ORGANIZED HEALTH CARE EDUCATION/TRAINING PROGRAM
Payer: COMMERCIAL

## 2023-02-25 VITALS
HEART RATE: 95 BPM | OXYGEN SATURATION: 100 % | SYSTOLIC BLOOD PRESSURE: 112 MMHG | TEMPERATURE: 97.9 F | DIASTOLIC BLOOD PRESSURE: 65 MMHG | BODY MASS INDEX: 23.49 KG/M2 | HEIGHT: 67 IN | RESPIRATION RATE: 17 BRPM

## 2023-02-25 LAB
BACTERIA UR QL AUTO: NORMAL /HPF
BASOPHILS # BLD AUTO: 0.01 10*3/MM3 (ref 0–0.2)
BASOPHILS NFR BLD AUTO: 0.1 % (ref 0–1.5)
BILIRUB UR QL STRIP: NEGATIVE
CLARITY UR: CLEAR
COLOR UR: YELLOW
DEPRECATED RDW RBC AUTO: 40.4 FL (ref 37–54)
EOSINOPHIL # BLD AUTO: 0.05 10*3/MM3 (ref 0–0.4)
EOSINOPHIL NFR BLD AUTO: 0.7 % (ref 0.3–6.2)
ERYTHROCYTE [DISTWIDTH] IN BLOOD BY AUTOMATED COUNT: 12 % (ref 12.3–15.4)
FIBRINOGEN PPP-MCNC: 576 MG/DL (ref 219–464)
GLUCOSE UR STRIP-MCNC: NEGATIVE MG/DL
HCT VFR BLD AUTO: 30.8 % (ref 34–46.6)
HGB BLD-MCNC: 10.7 G/DL (ref 12–15.9)
HGB UR QL STRIP.AUTO: ABNORMAL
HYALINE CASTS UR QL AUTO: NORMAL /LPF
IMM GRANULOCYTES # BLD AUTO: 0.02 10*3/MM3 (ref 0–0.05)
IMM GRANULOCYTES NFR BLD AUTO: 0.3 % (ref 0–0.5)
INR PPP: 0.93 (ref 0.9–1.1)
KETONES UR QL STRIP: NEGATIVE
LEUKOCYTE ESTERASE UR QL STRIP.AUTO: ABNORMAL
LYMPHOCYTES # BLD AUTO: 0.66 10*3/MM3 (ref 0.7–3.1)
LYMPHOCYTES NFR BLD AUTO: 9.1 % (ref 19.6–45.3)
MCH RBC QN AUTO: 31.8 PG (ref 26.6–33)
MCHC RBC AUTO-ENTMCNC: 34.7 G/DL (ref 31.5–35.7)
MCV RBC AUTO: 91.4 FL (ref 79–97)
MONOCYTES # BLD AUTO: 0.67 10*3/MM3 (ref 0.1–0.9)
MONOCYTES NFR BLD AUTO: 9.2 % (ref 5–12)
NEUTROPHILS NFR BLD AUTO: 5.86 10*3/MM3 (ref 1.7–7)
NEUTROPHILS NFR BLD AUTO: 80.6 % (ref 42.7–76)
NITRITE UR QL STRIP: NEGATIVE
NRBC BLD AUTO-RTO: 0 /100 WBC (ref 0–0.2)
PH UR STRIP.AUTO: 6.5 [PH] (ref 5–8)
PLATELET # BLD AUTO: 181 10*3/MM3 (ref 140–450)
PMV BLD AUTO: 9.9 FL (ref 6–12)
PROT UR QL STRIP: NEGATIVE
PROTHROMBIN TIME: 12.5 SECONDS (ref 11.7–14.2)
RBC # BLD AUTO: 3.37 10*6/MM3 (ref 3.77–5.28)
RBC # UR STRIP: NORMAL /HPF
REF LAB TEST METHOD: NORMAL
SP GR UR STRIP: <1.005 (ref 1–1.03)
SQUAMOUS #/AREA URNS HPF: NORMAL /HPF
UROBILINOGEN UR QL STRIP: ABNORMAL
WBC # UR STRIP: NORMAL /HPF
WBC NRBC COR # BLD: 7.27 10*3/MM3 (ref 3.4–10.8)

## 2023-02-25 PROCEDURE — 85025 COMPLETE CBC W/AUTO DIFF WBC: CPT | Performed by: STUDENT IN AN ORGANIZED HEALTH CARE EDUCATION/TRAINING PROGRAM

## 2023-02-25 PROCEDURE — 99284 EMERGENCY DEPT VISIT MOD MDM: CPT | Performed by: STUDENT IN AN ORGANIZED HEALTH CARE EDUCATION/TRAINING PROGRAM

## 2023-02-25 PROCEDURE — 85610 PROTHROMBIN TIME: CPT | Performed by: STUDENT IN AN ORGANIZED HEALTH CARE EDUCATION/TRAINING PROGRAM

## 2023-02-25 PROCEDURE — 85384 FIBRINOGEN ACTIVITY: CPT | Performed by: STUDENT IN AN ORGANIZED HEALTH CARE EDUCATION/TRAINING PROGRAM

## 2023-02-25 PROCEDURE — 59025 FETAL NON-STRESS TEST: CPT

## 2023-02-25 PROCEDURE — 81001 URINALYSIS AUTO W/SCOPE: CPT | Performed by: STUDENT IN AN ORGANIZED HEALTH CARE EDUCATION/TRAINING PROGRAM

## 2023-02-25 PROCEDURE — 87086 URINE CULTURE/COLONY COUNT: CPT | Performed by: STUDENT IN AN ORGANIZED HEALTH CARE EDUCATION/TRAINING PROGRAM

## 2023-02-26 LAB — BACTERIA SPEC AEROBE CULT: NO GROWTH

## 2023-02-27 ENCOUNTER — TELEPHONE (OUTPATIENT)
Dept: OBSTETRICS AND GYNECOLOGY | Facility: CLINIC | Age: 23
End: 2023-02-27
Payer: COMMERCIAL

## 2023-02-27 NOTE — TELEPHONE ENCOUNTER
----- Message from Ray Rojas MD sent at 2/26/2023 11:19 PM EST -----  Bailey, urine culture did not show UTI. Please let her know. Thanks Dr. Rojas

## 2023-03-02 ENCOUNTER — PATIENT OUTREACH (OUTPATIENT)
Dept: LABOR AND DELIVERY | Facility: HOSPITAL | Age: 23
End: 2023-03-02
Payer: COMMERCIAL

## 2023-03-02 NOTE — OUTREACH NOTE
[facility]  Check-In    Current Estimated Gestational Age: 35w1d    Questions/Answers    Flowsheet Row Responses   Best Method for Contacting Cell   Demographics Reviewed Yes   Currently Employed Yes   Able to keep appointments as scheduled Yes   Gender(s) and Name(s) boy   Baby Active/Feeling Fetal Movemen Yes   How are you presently feeling? good, just tired all the time   May I ask you questions about your substance use? Yes   Other Comment denies, quit smoking with preg   Supplies ready for baby Clothing, Crib   Resource/Environmental Concerns None   Do you have any questions related to your care experience, your pregnancy, plans for delivery, any concerns, etc? No   Other Education Birth Plan        Pt doing well and feeling prepared for delivery, still planning for NCB. Will wait to get breast pump during admission. She called [facility] about car seat but unsure if it was ordered, will call them again today. Baby shower planned in a few weeks for remaining items. Reviewed labor precautions. F/u inpatient after delivery.      Rosita Jolley RN  Maternity Nurse Navigator    3/2/2023, 10:00 EST

## 2023-03-06 ENCOUNTER — ROUTINE PRENATAL (OUTPATIENT)
Dept: OBSTETRICS AND GYNECOLOGY | Facility: CLINIC | Age: 23
End: 2023-03-06
Payer: COMMERCIAL

## 2023-03-06 VITALS — WEIGHT: 151 LBS | BODY MASS INDEX: 23.65 KG/M2 | SYSTOLIC BLOOD PRESSURE: 105 MMHG | DIASTOLIC BLOOD PRESSURE: 67 MMHG

## 2023-03-06 DIAGNOSIS — O99.013 ANEMIA DURING PREGNANCY IN THIRD TRIMESTER: ICD-10-CM

## 2023-03-06 DIAGNOSIS — Z34.83 ENCOUNTER FOR SUPERVISION OF OTHER NORMAL PREGNANCY IN THIRD TRIMESTER: Primary | ICD-10-CM

## 2023-03-06 LAB
GLUCOSE UR STRIP-MCNC: NEGATIVE MG/DL
PROT UR STRIP-MCNC: NEGATIVE MG/DL

## 2023-03-06 PROCEDURE — 99214 OFFICE O/P EST MOD 30 MIN: CPT | Performed by: OBSTETRICS & GYNECOLOGY

## 2023-03-06 RX ORDER — FERROUS SULFATE 325(65) MG
325 TABLET ORAL
Qty: 30 TABLET | Refills: 6 | Status: SHIPPED | OUTPATIENT
Start: 2023-03-06

## 2023-03-06 NOTE — PROGRESS NOTES
OB follow up     Harleen Purdy is a 22 y.o.  35w5d being seen today for her obstetrical visit.  Patient reports no complaints. Fetal movement: normal.    Her prenatal care is complicated by (and status): anemia     Review of Systems  Cramping/contractions : none   Vaginal bleeding: none   Fetal movement good     /67   Wt 68.5 kg (151 lb)   LMP 2022 (Approximate)   BMI 23.65 kg/m²     FHT: 124 BPM   Uterine Size: 34 cm       Assessment    Diagnoses and all orders for this visit:    1. Encounter for supervision of other normal pregnancy in third trimester (Primary)    2. Anemia during pregnancy in third trimester    Other orders  -     ferrous sulfate 325 (65 FE) MG tablet; Take 1 tablet by mouth Daily With Breakfast.  Dispense: 30 tablet; Refill: 6        1) pregnancy at 35w5d   [facility] [place]   2) Mild anemia in pregnancy   Add oral iron to help   Expectations reviewed.         Plan    Reviewed this stage of pregnancy  Problem list updated   Follow up in 1 weeks    Ray Rojas MD   3/6/2023  13:26 EST

## 2023-03-13 ENCOUNTER — ROUTINE PRENATAL (OUTPATIENT)
Dept: OBSTETRICS AND GYNECOLOGY | Facility: CLINIC | Age: 23
End: 2023-03-13
Payer: COMMERCIAL

## 2023-03-13 VITALS — SYSTOLIC BLOOD PRESSURE: 107 MMHG | DIASTOLIC BLOOD PRESSURE: 67 MMHG | BODY MASS INDEX: 24.12 KG/M2 | WEIGHT: 154 LBS

## 2023-03-13 DIAGNOSIS — Z36.9 ANTENATAL SCREENING ENCOUNTER: ICD-10-CM

## 2023-03-13 DIAGNOSIS — Z34.83 ENCOUNTER FOR SUPERVISION OF OTHER NORMAL PREGNANCY IN THIRD TRIMESTER: Primary | ICD-10-CM

## 2023-03-13 DIAGNOSIS — O99.013 ANEMIA DURING PREGNANCY IN THIRD TRIMESTER: ICD-10-CM

## 2023-03-13 LAB
GLUCOSE UR STRIP-MCNC: NEGATIVE MG/DL
PROT UR STRIP-MCNC: NEGATIVE MG/DL

## 2023-03-13 PROCEDURE — 99213 OFFICE O/P EST LOW 20 MIN: CPT | Performed by: OBSTETRICS & GYNECOLOGY

## 2023-03-13 NOTE — PROGRESS NOTES
Ob follow up    Harleen Purdy is a 22 y.o.  36w5d patient being seen today for her obstetrical visit. Patient reports no complaints. Fetal movement: normal.    Her prenatal care is complicated by (and status) : anemia       ROS -   Fetal Movement good   Vaginal bleeding none   Cramping/Contractions none      /67   Wt 69.9 kg (154 lb)   LMP 2022 (Approximate)   BMI 24.12 kg/m²     FHT:  134 BPM    Uterine Size: 34 cm   Presentations: cephalic   Pelvic Exam:     Dilation: 1cm    Effacement: 25%    Station:  -2                 Assessment    Diagnoses and all orders for this visit:    1. Encounter for supervision of other normal pregnancy in third trimester (Primary)    2. Anemia during pregnancy in third trimester    3.  screening encounter  -     Strep B Screen - , Vaginal/Rectum        1) Pregnancy at 36w5d  2) Fetal status reassuring   3) GBS status - done today   4) Anemia, mild   Stable on oral iron     Plan    Labor warnings   [facility] [place] [facility]        Ray Rojas MD   3/13/2023  15:57 EDT

## 2023-03-15 LAB — GP B STREP DNA SPEC QL NAA+PROBE: NEGATIVE

## 2023-03-20 ENCOUNTER — ROUTINE PRENATAL (OUTPATIENT)
Dept: OBSTETRICS AND GYNECOLOGY | Facility: CLINIC | Age: 23
End: 2023-03-20
Payer: COMMERCIAL

## 2023-03-20 VITALS — BODY MASS INDEX: 24.12 KG/M2 | WEIGHT: 154 LBS | SYSTOLIC BLOOD PRESSURE: 116 MMHG | DIASTOLIC BLOOD PRESSURE: 70 MMHG

## 2023-03-20 DIAGNOSIS — Z34.83 ENCOUNTER FOR SUPERVISION OF OTHER NORMAL PREGNANCY IN THIRD TRIMESTER: Primary | ICD-10-CM

## 2023-03-20 DIAGNOSIS — O99.013 ANEMIA DURING PREGNANCY IN THIRD TRIMESTER: ICD-10-CM

## 2023-03-20 LAB
GLUCOSE UR STRIP-MCNC: NEGATIVE MG/DL
PROT UR STRIP-MCNC: ABNORMAL MG/DL

## 2023-03-20 PROCEDURE — 99213 OFFICE O/P EST LOW 20 MIN: CPT | Performed by: OBSTETRICS & GYNECOLOGY

## 2023-03-20 NOTE — PROGRESS NOTES
Ob follow up    Harleen Purdy is a 22 y.o.  37w5d patient being seen today for her obstetrical visit. Patient reports no complaints. Fetal movement: normal.    Her prenatal care is complicated by (and status) : anemia       ROS -   Fetal Movement good   Vaginal bleeding none   Cramping/Contractions none      /70   Wt 69.9 kg (154 lb)   LMP 2022 (Approximate)   BMI 24.12 kg/m²     FHT:  144 BPM    Uterine Size: 34 cm   Presentations: cephalic   Pelvic Exam:     Dilation: 1cm    Effacement: 25%    Station:  -2                 Assessment    Diagnoses and all orders for this visit:    1. Encounter for supervision of other normal pregnancy in third trimester (Primary)    2. Anemia during pregnancy in third trimester        1) Pregnancy at 37w5d  2) Fetal status reassuring   3) GBS status - negative   4) Anemia in pregnancy,   Mild and stable on oral iron     Discussed considerations, can start to electively induce 3/30/23, but she may want to just await labor.     Plan    Labor warnings   [facility] [place]        Ray Rojas MD   3/20/2023  15:34 EDT

## 2023-03-27 ENCOUNTER — ROUTINE PRENATAL (OUTPATIENT)
Dept: OBSTETRICS AND GYNECOLOGY | Facility: CLINIC | Age: 23
End: 2023-03-27
Payer: COMMERCIAL

## 2023-03-27 VITALS — BODY MASS INDEX: 23.96 KG/M2 | WEIGHT: 153 LBS | DIASTOLIC BLOOD PRESSURE: 67 MMHG | SYSTOLIC BLOOD PRESSURE: 107 MMHG

## 2023-03-27 DIAGNOSIS — O99.013 ANEMIA DURING PREGNANCY IN THIRD TRIMESTER: ICD-10-CM

## 2023-03-27 DIAGNOSIS — Z34.83 ENCOUNTER FOR SUPERVISION OF OTHER NORMAL PREGNANCY IN THIRD TRIMESTER: Primary | ICD-10-CM

## 2023-03-27 LAB
GLUCOSE UR STRIP-MCNC: NEGATIVE MG/DL
PROT UR STRIP-MCNC: NEGATIVE MG/DL

## 2023-03-27 NOTE — PROGRESS NOTES
Ob follow up    Harleen Purdy is a 22 y.o.  38w5d patient being seen today for her obstetrical visit. Patient reports no complaints. Fetal movement: normal.    Her prenatal care is complicated by (and status) : Anemia       ROS -   Fetal Movement good   Vaginal bleeding none   Cramping/Contractions intermittent      /67   Wt 69.4 kg (153 lb)   LMP 2022 (Approximate)   BMI 23.96 kg/m²     FHT:  134 BPM    Uterine Size: 34 cm   Presentations: cephalic   Pelvic Exam:     Dilation: 2cm    Effacement: 50%    Station:  -2                 Assessment    Diagnoses and all orders for this visit:    1. Encounter for supervision of other normal pregnancy in third trimester (Primary)    2. Anemia during pregnancy in third trimester        1) Pregnancy at 38w5d  2) Fetal status reassuring   3) GBS status - negative   4) Anemia  Mild and stable on oral iron     Not ready for induction at this time   Discussed I am available for 4/10/23 if desires     Plan    Labor warnings   [facility] [place]        Ray Rojas MD   3/27/2023  11:19 EDT

## 2023-03-29 ENCOUNTER — HOSPITAL ENCOUNTER (INPATIENT)
Facility: HOSPITAL | Age: 23
LOS: 2 days | Discharge: HOME | End: 2023-03-31
Attending: OBSTETRICS & GYNECOLOGY | Admitting: OBSTETRICS & GYNECOLOGY
Payer: COMMERCIAL

## 2023-03-29 ENCOUNTER — ANESTHESIA EVENT (OUTPATIENT)
Dept: LABOR AND DELIVERY | Facility: HOSPITAL | Age: 23
End: 2023-03-29
Payer: COMMERCIAL

## 2023-03-29 ENCOUNTER — ANESTHESIA (OUTPATIENT)
Dept: LABOR AND DELIVERY | Facility: HOSPITAL | Age: 23
End: 2023-03-29
Payer: COMMERCIAL

## 2023-03-29 LAB
ABO GROUP BLD: NORMAL
ALBUMIN SERPL-MCNC: 3.7 G/DL (ref 3.5–5.2)
ALBUMIN/GLOB SERPL: 1.2 G/DL
ALP SERPL-CCNC: 206 U/L (ref 39–117)
ALT SERPL W P-5'-P-CCNC: 21 U/L (ref 1–33)
ANION GAP SERPL CALCULATED.3IONS-SCNC: 11 MMOL/L (ref 5–15)
AST SERPL-CCNC: 17 U/L (ref 1–32)
BASOPHILS # BLD AUTO: 0.03 10*3/MM3 (ref 0–0.2)
BASOPHILS NFR BLD AUTO: 0.2 % (ref 0–1.5)
BILIRUB SERPL-MCNC: 0.3 MG/DL (ref 0–1.2)
BLD GP AB SCN SERPL QL: NEGATIVE
BUN SERPL-MCNC: 8 MG/DL (ref 6–20)
BUN/CREAT SERPL: 14.5 (ref 7–25)
CALCIUM SPEC-SCNC: 8.7 MG/DL (ref 8.6–10.5)
CHLORIDE SERPL-SCNC: 106 MMOL/L (ref 98–107)
CO2 SERPL-SCNC: 23 MMOL/L (ref 22–29)
CREAT SERPL-MCNC: 0.55 MG/DL (ref 0.57–1)
DEPRECATED RDW RBC AUTO: 41.2 FL (ref 37–54)
EGFRCR SERPLBLD CKD-EPI 2021: 133.9 ML/MIN/1.73
EOSINOPHIL # BLD AUTO: 0.04 10*3/MM3 (ref 0–0.4)
EOSINOPHIL NFR BLD AUTO: 0.3 % (ref 0.3–6.2)
ERYTHROCYTE [DISTWIDTH] IN BLOOD BY AUTOMATED COUNT: 12.5 % (ref 12.3–15.4)
GLOBULIN UR ELPH-MCNC: 3.1 GM/DL
GLUCOSE SERPL-MCNC: 86 MG/DL (ref 65–99)
HCT VFR BLD AUTO: 37.6 % (ref 34–46.6)
HGB BLD-MCNC: 12.9 G/DL (ref 12–15.9)
IMM GRANULOCYTES # BLD AUTO: 0.04 10*3/MM3 (ref 0–0.05)
IMM GRANULOCYTES NFR BLD AUTO: 0.3 % (ref 0–0.5)
LYMPHOCYTES # BLD AUTO: 0.98 10*3/MM3 (ref 0.7–3.1)
LYMPHOCYTES NFR BLD AUTO: 7.4 % (ref 19.6–45.3)
MCH RBC QN AUTO: 31.3 PG (ref 26.6–33)
MCHC RBC AUTO-ENTMCNC: 34.3 G/DL (ref 31.5–35.7)
MCV RBC AUTO: 91.3 FL (ref 79–97)
MONOCYTES # BLD AUTO: 0.98 10*3/MM3 (ref 0.1–0.9)
MONOCYTES NFR BLD AUTO: 7.4 % (ref 5–12)
NEUTROPHILS NFR BLD AUTO: 11.11 10*3/MM3 (ref 1.7–7)
NEUTROPHILS NFR BLD AUTO: 84.4 % (ref 42.7–76)
NRBC BLD AUTO-RTO: 0 /100 WBC (ref 0–0.2)
PLATELET # BLD AUTO: 192 10*3/MM3 (ref 140–450)
PMV BLD AUTO: 10.4 FL (ref 6–12)
POTASSIUM SERPL-SCNC: 4.4 MMOL/L (ref 3.5–5.2)
PROT SERPL-MCNC: 6.8 G/DL (ref 6–8.5)
RBC # BLD AUTO: 4.12 10*6/MM3 (ref 3.77–5.28)
RH BLD: POSITIVE
SODIUM SERPL-SCNC: 140 MMOL/L (ref 136–145)
T&S EXPIRATION DATE: NORMAL
WBC NRBC COR # BLD: 13.18 10*3/MM3 (ref 3.4–10.8)

## 2023-03-29 PROCEDURE — 99202 OFFICE O/P NEW SF 15 MIN: CPT | Performed by: OBSTETRICS & GYNECOLOGY

## 2023-03-29 PROCEDURE — S0260 H&P FOR SURGERY: HCPCS | Performed by: OBSTETRICS & GYNECOLOGY

## 2023-03-29 PROCEDURE — 86901 BLOOD TYPING SEROLOGIC RH(D): CPT | Performed by: OBSTETRICS & GYNECOLOGY

## 2023-03-29 PROCEDURE — 59410 OBSTETRICAL CARE: CPT | Performed by: OBSTETRICS & GYNECOLOGY

## 2023-03-29 PROCEDURE — 86850 RBC ANTIBODY SCREEN: CPT | Performed by: OBSTETRICS & GYNECOLOGY

## 2023-03-29 PROCEDURE — 80053 COMPREHEN METABOLIC PANEL: CPT | Performed by: OBSTETRICS & GYNECOLOGY

## 2023-03-29 PROCEDURE — 86900 BLOOD TYPING SEROLOGIC ABO: CPT | Performed by: OBSTETRICS & GYNECOLOGY

## 2023-03-29 PROCEDURE — 85025 COMPLETE CBC W/AUTO DIFF WBC: CPT | Performed by: OBSTETRICS & GYNECOLOGY

## 2023-03-29 PROCEDURE — C1755 CATHETER, INTRASPINAL: HCPCS | Performed by: ANESTHESIOLOGY

## 2023-03-29 RX ORDER — TERBUTALINE SULFATE 1 MG/ML
0.25 INJECTION, SOLUTION SUBCUTANEOUS AS NEEDED
Status: DISCONTINUED | OUTPATIENT
Start: 2023-03-29 | End: 2023-03-29 | Stop reason: HOSPADM

## 2023-03-29 RX ORDER — TRANEXAMIC ACID 10 MG/ML
1000 INJECTION, SOLUTION INTRAVENOUS ONCE AS NEEDED
Status: DISCONTINUED | OUTPATIENT
Start: 2023-03-29 | End: 2023-03-31 | Stop reason: HOSPADM

## 2023-03-29 RX ORDER — METHYLERGONOVINE MALEATE 0.2 MG/ML
200 INJECTION INTRAVENOUS ONCE AS NEEDED
Status: DISCONTINUED | OUTPATIENT
Start: 2023-03-29 | End: 2023-03-31 | Stop reason: HOSPADM

## 2023-03-29 RX ORDER — PRENATAL VIT/IRON FUM/FOLIC AC 27MG-0.8MG
1 TABLET ORAL DAILY
Status: DISCONTINUED | OUTPATIENT
Start: 2023-03-29 | End: 2023-03-31 | Stop reason: HOSPADM

## 2023-03-29 RX ORDER — ONDANSETRON 2 MG/ML
4 INJECTION INTRAMUSCULAR; INTRAVENOUS ONCE AS NEEDED
Status: DISCONTINUED | OUTPATIENT
Start: 2023-03-29 | End: 2023-03-29 | Stop reason: HOSPADM

## 2023-03-29 RX ORDER — HYDROCORTISONE 25 MG/G
1 CREAM TOPICAL AS NEEDED
Status: DISCONTINUED | OUTPATIENT
Start: 2023-03-29 | End: 2023-03-31 | Stop reason: HOSPADM

## 2023-03-29 RX ORDER — CARBOPROST TROMETHAMINE 250 UG/ML
250 INJECTION, SOLUTION INTRAMUSCULAR
Status: DISCONTINUED | OUTPATIENT
Start: 2023-03-29 | End: 2023-03-29 | Stop reason: HOSPADM

## 2023-03-29 RX ORDER — SODIUM CHLORIDE 0.9 % (FLUSH) 0.9 %
10 SYRINGE (ML) INJECTION AS NEEDED
Status: DISCONTINUED | OUTPATIENT
Start: 2023-03-29 | End: 2023-03-29 | Stop reason: HOSPADM

## 2023-03-29 RX ORDER — ACETAMINOPHEN 325 MG/1
650 TABLET ORAL EVERY 6 HOURS PRN
Status: DISCONTINUED | OUTPATIENT
Start: 2023-03-29 | End: 2023-03-31 | Stop reason: HOSPADM

## 2023-03-29 RX ORDER — SODIUM CHLORIDE, SODIUM LACTATE, POTASSIUM CHLORIDE, CALCIUM CHLORIDE 600; 310; 30; 20 MG/100ML; MG/100ML; MG/100ML; MG/100ML
125 INJECTION, SOLUTION INTRAVENOUS CONTINUOUS
Status: DISCONTINUED | OUTPATIENT
Start: 2023-03-29 | End: 2023-03-29

## 2023-03-29 RX ORDER — HYDROCODONE BITARTRATE AND ACETAMINOPHEN 5; 325 MG/1; MG/1
1 TABLET ORAL EVERY 4 HOURS PRN
Status: DISCONTINUED | OUTPATIENT
Start: 2023-03-29 | End: 2023-03-31 | Stop reason: HOSPADM

## 2023-03-29 RX ORDER — OXYTOCIN/0.9 % SODIUM CHLORIDE 30/500 ML
125 PLASTIC BAG, INJECTION (ML) INTRAVENOUS CONTINUOUS PRN
Status: DISCONTINUED | OUTPATIENT
Start: 2023-03-29 | End: 2023-03-31 | Stop reason: HOSPADM

## 2023-03-29 RX ORDER — BISACODYL 10 MG
10 SUPPOSITORY, RECTAL RECTAL DAILY PRN
Status: DISCONTINUED | OUTPATIENT
Start: 2023-03-30 | End: 2023-03-31 | Stop reason: HOSPADM

## 2023-03-29 RX ORDER — MISOPROSTOL 200 UG/1
800 TABLET ORAL ONCE AS NEEDED
Status: DISCONTINUED | OUTPATIENT
Start: 2023-03-29 | End: 2023-03-29 | Stop reason: HOSPADM

## 2023-03-29 RX ORDER — ONDANSETRON 2 MG/ML
4 INJECTION INTRAMUSCULAR; INTRAVENOUS EVERY 6 HOURS PRN
Status: DISCONTINUED | OUTPATIENT
Start: 2023-03-29 | End: 2023-03-29 | Stop reason: HOSPADM

## 2023-03-29 RX ORDER — LIDOCAINE HYDROCHLORIDE AND EPINEPHRINE 15; 5 MG/ML; UG/ML
INJECTION, SOLUTION EPIDURAL AS NEEDED
Status: DISCONTINUED | OUTPATIENT
Start: 2023-03-29 | End: 2023-03-29 | Stop reason: SURG

## 2023-03-29 RX ORDER — FENTANYL CIT 0.2 MG/100ML-ROPIV 0.2%-NACL 0.9% EPIDURAL INJ 2/0.2 MCG/ML-%
10 SOLUTION INJECTION CONTINUOUS
Status: DISCONTINUED | OUTPATIENT
Start: 2023-03-29 | End: 2023-03-29

## 2023-03-29 RX ORDER — LIDOCAINE HYDROCHLORIDE 10 MG/ML
5 INJECTION, SOLUTION EPIDURAL; INFILTRATION; INTRACAUDAL; PERINEURAL AS NEEDED
Status: DISCONTINUED | OUTPATIENT
Start: 2023-03-29 | End: 2023-03-29 | Stop reason: HOSPADM

## 2023-03-29 RX ORDER — SODIUM CHLORIDE 0.9 % (FLUSH) 0.9 %
10 SYRINGE (ML) INJECTION EVERY 12 HOURS SCHEDULED
Status: DISCONTINUED | OUTPATIENT
Start: 2023-03-29 | End: 2023-03-29 | Stop reason: HOSPADM

## 2023-03-29 RX ORDER — HYDROCODONE BITARTRATE AND ACETAMINOPHEN 10; 325 MG/1; MG/1
1 TABLET ORAL EVERY 4 HOURS PRN
Status: DISCONTINUED | OUTPATIENT
Start: 2023-03-29 | End: 2023-03-31 | Stop reason: HOSPADM

## 2023-03-29 RX ORDER — ERYTHROMYCIN 5 MG/G
OINTMENT OPHTHALMIC
Status: ACTIVE
Start: 2023-03-29 | End: 2023-03-30

## 2023-03-29 RX ORDER — PHYTONADIONE 1 MG/.5ML
INJECTION, EMULSION INTRAMUSCULAR; INTRAVENOUS; SUBCUTANEOUS
Status: ACTIVE
Start: 2023-03-29 | End: 2023-03-30

## 2023-03-29 RX ORDER — MAGNESIUM CARB/ALUMINUM HYDROX 105-160MG
30 TABLET,CHEWABLE ORAL ONCE AS NEEDED
Status: DISCONTINUED | OUTPATIENT
Start: 2023-03-29 | End: 2023-03-29 | Stop reason: HOSPADM

## 2023-03-29 RX ORDER — ACETAMINOPHEN 325 MG/1
650 TABLET ORAL EVERY 4 HOURS PRN
Status: DISCONTINUED | OUTPATIENT
Start: 2023-03-29 | End: 2023-03-29 | Stop reason: HOSPADM

## 2023-03-29 RX ORDER — IBUPROFEN 600 MG/1
600 TABLET ORAL EVERY 6 HOURS PRN
Status: DISCONTINUED | OUTPATIENT
Start: 2023-03-29 | End: 2023-03-31 | Stop reason: HOSPADM

## 2023-03-29 RX ORDER — HYDROXYZINE 50 MG/1
50 TABLET, FILM COATED ORAL NIGHTLY PRN
Status: DISCONTINUED | OUTPATIENT
Start: 2023-03-29 | End: 2023-03-31 | Stop reason: HOSPADM

## 2023-03-29 RX ORDER — OXYTOCIN/0.9 % SODIUM CHLORIDE 30/500 ML
999 PLASTIC BAG, INJECTION (ML) INTRAVENOUS ONCE
Status: COMPLETED | OUTPATIENT
Start: 2023-03-29 | End: 2023-03-29

## 2023-03-29 RX ORDER — OXYTOCIN/0.9 % SODIUM CHLORIDE 30/500 ML
250 PLASTIC BAG, INJECTION (ML) INTRAVENOUS CONTINUOUS
Status: ACTIVE | OUTPATIENT
Start: 2023-03-29 | End: 2023-03-29

## 2023-03-29 RX ORDER — ONDANSETRON 4 MG/1
4 TABLET, FILM COATED ORAL EVERY 6 HOURS PRN
Status: DISCONTINUED | OUTPATIENT
Start: 2023-03-29 | End: 2023-03-29 | Stop reason: HOSPADM

## 2023-03-29 RX ORDER — EPHEDRINE SULFATE 50 MG/ML
5 INJECTION, SOLUTION INTRAVENOUS
Status: DISCONTINUED | OUTPATIENT
Start: 2023-03-29 | End: 2023-03-29 | Stop reason: HOSPADM

## 2023-03-29 RX ORDER — SODIUM CHLORIDE 0.9 % (FLUSH) 0.9 %
1-10 SYRINGE (ML) INJECTION AS NEEDED
Status: DISCONTINUED | OUTPATIENT
Start: 2023-03-29 | End: 2023-03-31 | Stop reason: HOSPADM

## 2023-03-29 RX ORDER — FAMOTIDINE 10 MG/ML
20 INJECTION, SOLUTION INTRAVENOUS 2 TIMES DAILY PRN
Status: DISCONTINUED | OUTPATIENT
Start: 2023-03-29 | End: 2023-03-29 | Stop reason: HOSPADM

## 2023-03-29 RX ORDER — OXYTOCIN/0.9 % SODIUM CHLORIDE 30/500 ML
125 PLASTIC BAG, INJECTION (ML) INTRAVENOUS CONTINUOUS PRN
Status: COMPLETED | OUTPATIENT
Start: 2023-03-29 | End: 2023-03-29

## 2023-03-29 RX ORDER — DOCUSATE SODIUM 100 MG/1
100 CAPSULE, LIQUID FILLED ORAL 2 TIMES DAILY
Status: DISCONTINUED | OUTPATIENT
Start: 2023-03-29 | End: 2023-03-31 | Stop reason: HOSPADM

## 2023-03-29 RX ORDER — FAMOTIDINE 20 MG/1
20 TABLET, FILM COATED ORAL 2 TIMES DAILY PRN
Status: DISCONTINUED | OUTPATIENT
Start: 2023-03-29 | End: 2023-03-29 | Stop reason: HOSPADM

## 2023-03-29 RX ORDER — METHYLERGONOVINE MALEATE 0.2 MG/ML
200 INJECTION INTRAVENOUS ONCE AS NEEDED
Status: DISCONTINUED | OUTPATIENT
Start: 2023-03-29 | End: 2023-03-29 | Stop reason: HOSPADM

## 2023-03-29 RX ADMIN — LIDOCAINE HYDROCHLORIDE AND EPINEPHRINE 3 ML: 15; 5 INJECTION, SOLUTION EPIDURAL at 15:42

## 2023-03-29 RX ADMIN — LIDOCAINE HYDROCHLORIDE AND EPINEPHRINE 2 ML: 15; 5 INJECTION, SOLUTION EPIDURAL at 15:44

## 2023-03-29 RX ADMIN — SODIUM CHLORIDE, POTASSIUM CHLORIDE, SODIUM LACTATE AND CALCIUM CHLORIDE 999 ML/HR: 600; 310; 30; 20 INJECTION, SOLUTION INTRAVENOUS at 13:20

## 2023-03-29 RX ADMIN — SODIUM CHLORIDE, POTASSIUM CHLORIDE, SODIUM LACTATE AND CALCIUM CHLORIDE 1000 ML: 600; 310; 30; 20 INJECTION, SOLUTION INTRAVENOUS at 15:41

## 2023-03-29 RX ADMIN — Medication 10 ML/HR: at 15:49

## 2023-03-29 RX ADMIN — Medication 1 TABLET: at 20:21

## 2023-03-29 RX ADMIN — Medication 125 ML/HR: at 17:48

## 2023-03-29 RX ADMIN — Medication 999 ML/HR: at 16:20

## 2023-03-29 NOTE — PLAN OF CARE
Goal Outcome Evaluation:  Plan of Care Reviewed With: patient, significant other        Progress: improving  Outcome Evaluation: s/p vaginal delivery, anticipate transfer to mother baby

## 2023-03-29 NOTE — H&P
H&P Note    Patient Identification:  Name: Harleen Purdy  Age: 22 y.o.  Sex: female  :  2000  MRN: [id number]                       Chief Complaint: Contractions    History of Present Illness:   22-year-old  2 para 1 presented at 39-0/7 weeks with painful contractions.  Active labor was confirmed on OB ED.  Group B strep status was negative.  Fetal heart tones are reassuring.    Problem List:  [unfilled]  Past Medical History:  History reviewed. No pertinent past medical history.  Past Surgical History:  Past Surgical History:   Procedure Laterality Date   • LEG SURGERY        Home Meds:  Medications Prior to Admission   Medication Sig Dispense Refill Last Dose   • ferrous sulfate 325 (65 FE) MG tablet Take 1 tablet by mouth Daily With Breakfast. 30 tablet 6 3/28/2023   • Prenatal 28-0.8 MG tablet Take 1 tablet by mouth Daily. Please use formulary or generic, with DHA ideal 90 each 3 3/28/2023   • promethazine (PHENERGAN) 25 MG tablet Take 1 tablet by mouth Every 6 (Six) Hours As Needed for Nausea or Vomiting. 30 tablet 2 Unknown     Current Meds:   [unfilled]  Allergies:  No Known Allergies  Immunizations:    There is no immunization history on file for this patient.  Social History:   Social History     Tobacco Use   • Smoking status: Former     Types: Cigarettes   • Smokeless tobacco: Never   Substance Use Topics   • Alcohol use: Not Currently      Family History:  Family History   Problem Relation Age of Onset   • No Known Problems Father    • No Known Problems Mother    • Breast cancer Neg Hx    • Ovarian cancer Neg Hx    • Uterine cancer Neg Hx    • Colon cancer Neg Hx    • Deep vein thrombosis Neg Hx    • Pulmonary embolism Neg Hx         Review of Systems  A comprehensive review of systems was negative.    Objective:  tMax 24 hrs: Temp (24hrs), Av.4 °F (36.9 °C), Min:98.4 °F (36.9 °C), Max:98.4 °F (36.9 °C)    Vitals Ranges:   Temp:  [98.4 °F (36.9 °C)] 98.4 °F (36.9 °C)  Heart Rate:   [87-98] 87  Resp:  [18] 18  BP: (109-122)/(63-82) 111/63  Intake and Output Last 3 Shifts:   No intake/output data recorded.    Exam:     General Appearance:    Alert, cooperative, no distress, appears stated age   Head:    Normocephalic, without obvious abnormality, atraumatic   Back:     Symmetric, no curvature, ROM normal, no CVA tenderness   Lungs:     Clear to auscultation bilaterally, respirations unlabored   Chest Wall:    No tenderness or deformity    Heart:    Regular rate and rhythm, S1 and S2 normal, no murmur, rub   or gallop       Abdomen:    Soft, gravid.   Genitalia:   Cervix is completely effaced and 7 cm dilated.  Amniotomy performed with return of clear fluid   Rectal:   Not examined today   Extremities:  Equal in size   Skin:   Skin color, texture, turgor normal, no rashes or lesions   Lymph nodes:   Cervical, supraclavicular, and axillary nodes normal       Data Review:     Lab Results (last 24 hours)     Procedure Component Value Units Date/Time    Comprehensive Metabolic Panel [[id number]]  (Abnormal) Collected: 03/29/23 1318    Specimen: Blood Updated: 03/29/23 1349     Glucose 86 mg/dL      BUN 8 mg/dL      Creatinine 0.55 mg/dL      Sodium 140 mmol/L      Potassium 4.4 mmol/L      Chloride 106 mmol/L      CO2 23.0 mmol/L      Calcium 8.7 mg/dL      Total Protein 6.8 g/dL      Albumin 3.7 g/dL      ALT (SGPT) 21 U/L      AST (SGOT) 17 U/L      Alkaline Phosphatase 206 U/L      Total Bilirubin 0.3 mg/dL      Globulin 3.1 gm/dL      A/G Ratio 1.2 g/dL      BUN/Creatinine Ratio 14.5     Anion Gap 11.0 mmol/L      eGFR 133.9 mL/min/1.73     Narrative:      GFR Normal >60  Chronic Kidney Disease <60  Kidney Failure <15      CBC & Differential [[id number]]  (Abnormal) Collected: 03/29/23 1318    Specimen: Blood Updated: 03/29/23 1329    Narrative:      The following orders were created for panel order CBC & Differential.  Procedure                               Abnormality         Status                      ---------                               -----------         ------                     CBC Auto Differential[[id number]]        Abnormal            Final result                 Please view results for these tests on the individual orders.    CBC Auto Differential [[id number]]  (Abnormal) Collected: 03/29/23 1318    Specimen: Blood Updated: 03/29/23 1329     WBC 13.18 10*3/mm3      RBC 4.12 10*6/mm3      Hemoglobin 12.9 g/dL      Hematocrit 37.6 %      MCV 91.3 fL      MCH 31.3 pg      MCHC 34.3 g/dL      RDW 12.5 %      RDW-SD 41.2 fl      MPV 10.4 fL      Platelets 192 10*3/mm3      Neutrophil % 84.4 %      Lymphocyte % 7.4 %      Monocyte % 7.4 %      Eosinophil % 0.3 %      Basophil % 0.2 %      Immature Grans % 0.3 %      Neutrophils, Absolute 11.11 10*3/mm3      Lymphocytes, Absolute 0.98 10*3/mm3      Monocytes, Absolute 0.98 10*3/mm3      Eosinophils, Absolute 0.04 10*3/mm3      Basophils, Absolute 0.03 10*3/mm3      Immature Grans, Absolute 0.04 10*3/mm3      nRBC 0.0 /100 WBC         Assessment:    Pregnancy      1.  Intrauterine pregnancy at 39-0/7 weeks  2.  Active labor    Plan:  Anticipate a vaginal delivery    Aquiles Siu MD  3/29/2023

## 2023-03-29 NOTE — ANESTHESIA PREPROCEDURE EVALUATION
Anesthesia Evaluation     Patient summary reviewed and Nursing notes reviewed   NPO Solid Status: > 8 hours  NPO Liquid Status: > 2 hours           Airway   Mallampati: II  TM distance: >3 FB  Neck ROM: full  Dental      Pulmonary - negative pulmonary ROS   Cardiovascular - negative cardio ROS  Exercise tolerance: good (4-7 METS)    Rhythm: regular  Rate: normal        Neuro/Psych- negative ROS  GI/Hepatic/Renal/Endo - negative ROS     Musculoskeletal (-) negative ROS    Abdominal    Substance History - negative use     OB/GYN    (+) Pregnant,         Other                        Anesthesia Plan    ASA 2     epidural     (39w0d for labor epidural )  intravenous induction     Anesthetic plan, risks, benefits, and alternatives have been provided, discussed and informed consent has been obtained with: patient.        CODE STATUS:    Level Of Support Discussed With: Patient  Code Status (Patient has no pulse and is not breathing): CPR (Attempt to Resuscitate)  Medical Interventions (Patient has pulse or is breathing): Full Support  Release to patient: Routine Release

## 2023-03-29 NOTE — L&D DELIVERY NOTE
Delivery Note    Obstetrician:   Aquiles Siu MD      Pre-Delivery Diagnosis: 1.  Intrauterine pregnancy at 39-0/7 weeks  2.  Active labor    Post-Delivery Diagnosis: Same    Procedure: Spontaneous vaginal delivery     Episiotomy or Incision: none      Old  2 para 1 presented at 39-0/7 weeks in active labor.  Group B strep status was negative.  Fetal heart tones were reactive.      The patient was admitted.  Amniotomy was performed at 7 cm with return of clear fluid.  An epidural catheter was placed at 8 to 9 cm.  The patient progressed along an adequate labor curve to complete effacement of dilatation as well as +2 station of the presenting part.  She felt a strong urge to push.      The patient pushed to spontaneous vaginal delivery of the fetal head from direct occiput anterior presentation.  There was a nuchal cord x1.  It was reduced.  The shoulders were delivered without difficulty, followed by the remainder of the infant.  Mouth and nares were suctioned with a bulb after delivery.  The infant was then placed on the mother's chest for Kangaroo care.  This is a vigorous male with Apgars of 8 and 9.      The perineum was inspected.  It was intact.  The placenta  spontaneously.  It was intact and had a three-vessel cord.  The cervix was examined.  It was intact as well.          Apgars: 1' 8  /  5' 9    Placenta and Cord:          Mechanism: spontaneous        Description:  complete    Estimated Blood Loss:  318cc                             Complications:  None           Condition: Stable    Male       3/29/2023  Aquiles Siu MD

## 2023-03-29 NOTE — ANESTHESIA POSTPROCEDURE EVALUATION
Patient: Harleen Purdy    Procedure Summary     Date: 03/29/23 Room / Location:     Anesthesia Start: 1531 Anesthesia Stop: 1615    Procedure: LABOR ANALGESIA Diagnosis:     Scheduled Providers:  Provider: Angi Cagle MD    Anesthesia Type: epidural ASA Status: 2          Anesthesia Type: epidural    Vitals  Vitals Value Taken Time   BP 85/53 03/29/23 1624   Temp     Pulse 84 03/29/23 1624   Resp     SpO2 100 % 03/29/23 1610   Vitals shown include unvalidated device data.        Post Anesthesia Care and Evaluation    Anesthetic complications: No anesthetic complications

## 2023-03-29 NOTE — OBED NOTES
JUDY Note [facility]        Patient Name: Harleen Purdy  YOB: 2000  MRN: [id number]  Admission Date: 3/29/2023 12:40 PM  Date of Service: 3/29/2023    Chief Complaint: Laboring (JUDY: pt came in with complaints or laboring since last night and the contractions picked up this morning. +FM, Denies loss of fluid, having bloody show. )        Subjective     Harleen Purdy is a 22 y.o. female  at 39w0d with Estimated Date of Delivery: 23 who presents with the chief complaint listed above.  She sees Ray Rojas MD for her prenatal care. Her pregnancy has been uncomplicated.  Presents secondary to contractions since last night which have become stronger over time.  She is feeling normal fetal movement.  She is having some bloody show.  She denies rupture of membranes.  She was noted to be 2 cm 2 days ago              Objective   Patient Active Problem List    Diagnosis    • *Pregnancy [Z34.90]    • Normal vaginal delivery [O80]         OB History    Para Term  AB Living   2 1 1 0 0 1   SAB IAB Ectopic Molar Multiple Live Births   0 0 0 0 0 1      # Outcome Date GA Lbr Lorenzo/2nd Weight Sex Delivery Anes PTL Lv   2 Current            1 Term 09/10/21 39w3d / 00:17 3426 g (7 lb 8.9 oz) M Vag-Spont EPI N GHULAM      Birth Comments: scale 1      Complications: Fetal Intolerance      Name: JEFFY PURDY      Apgar1: 8  Apgar5: 9        History reviewed. No pertinent past medical history.    Past Surgical History:   Procedure Laterality Date   • LEG SURGERY         No current facility-administered medications on file prior to encounter.     Current Outpatient Medications on File Prior to Encounter   Medication Sig Dispense Refill   • ferrous sulfate 325 (65 FE) MG tablet Take 1 tablet by mouth Daily With Breakfast. 30 tablet 6   • Prenatal 28-0.8 MG tablet Take 1 tablet by mouth Daily. Please use formulary or generic, with DHA ideal 90 each 3   • promethazine (PHENERGAN) 25 MG  "tablet Take 1 tablet by mouth Every 6 (Six) Hours As Needed for Nausea or Vomiting. 30 tablet 2       No Known Allergies    Family History   Problem Relation Age of Onset   • No Known Problems Father    • No Known Problems Mother    • Breast cancer Neg Hx    • Ovarian cancer Neg Hx    • Uterine cancer Neg Hx    • Colon cancer Neg Hx    • Deep vein thrombosis Neg Hx    • Pulmonary embolism Neg Hx        Social History     Socioeconomic History   • Marital status: Single   Tobacco Use   • Smoking status: Former     Types: Cigarettes   • Smokeless tobacco: Never   Vaping Use   • Vaping Use: Never used   Substance and Sexual Activity   • Alcohol use: Not Currently   • Drug use: Never   • Sexual activity: Yes     Partners: Male           Review of Systems   Constitutional: Negative for chills, fatigue and fever.   HENT: Negative.    Eyes: Negative for photophobia and visual disturbance.   Respiratory: Negative for cough, chest tightness and shortness of breath.    Cardiovascular: Negative for chest pain and leg swelling.   Gastrointestinal: Positive for abdominal pain (intermittent abdominal pain). Negative for diarrhea, nausea and vomiting.   Genitourinary: Positive for vaginal bleeding ( Reports a small amount of bleeding). Negative for dysuria, flank pain, frequency, hematuria, pelvic pain, urgency and vaginal discharge.   Musculoskeletal: Negative for back pain.   Neurological: Negative for dizziness, seizures, weakness and headaches.      PHYSICAL EXAM:      VITAL SIGNS:  Vitals:    03/29/23 1314 03/29/23 1319   BP: 116/82    Pulse: 98    Height:  170.2 cm (67\")        FHT'S:                   Baseline:  125 BPM  Variability:  Moderate = 6 - 25 BPM  Accelerations:  15 x 15 accelerations not noted at this time however, patient has not been on the monitor for  30 minutes     Decelerations:  Early decelerations are present  Contractions:   present     Interpretation:   CAT 2 tracing        PHYSICAL EXAM:    General: " well developed; well nourished  no acute distress   Heart: Not performed.   Lungs: breathing is unlabored     Abdomen: soft, non-tender; no masses  no umbilical or inguinal hernias are present       Cervix: was examined by nurse  Cervical Dilation (cm): 4  Cervical Effacement: 100%  Fetal Station: -1  Cervical Consistency: soft  Cervical Position: mid-position   Contractions: irregular        Extremities: peripheral pulses normal, no pedal edema, no clubbing or cyanosis      LABS AND TESTING ORDERED:  1. Uterine and fetal monitoring  2. Urinalysis  3. Admission due to cervical dilation of 4 cm    LAB RESULTS:    No results found for this or any previous visit (from the past 24 hour(s)).    Lab Results   Component Value Date    ABO O 2022    RH Positive 2022       Lab Results   Component Value Date    STREPGPB Negative 2023                 Assessment & Plan     ASSESSMENT/PLAN:  Harleen Purdy is a 22 y.o. female  at 39w0d who presented with abdominal pain and uterine contractions.   She was evaluated for labor and her cervix  was noted to be Cervical Dilation (cm): 4 cm/ Cervical Effacement: 100% % effaced/ vertex at Fetal Station: -1 station on initial exam. She is having irregular contractions. She was noted to have a CAT 2 tracing.  In view of these findings she will be admitted for delivery by Dr Sherron MAY, who will assume care and place orders at this time.      Final Impression:  • Pregnancy at 39w0d  •   • CAT 2 tracing  • In active labor with irregular contractions and cervical cervical exam:Cervical Dilation (cm): 4 cm/ Cervical Effacement: 100% % effaced/ vertex at Fetal Station: -1 station  Lab Results   Component Value Date    STREPGPB Negative 2023   •   Lab Results   Component Value Date    ABO O 2022    RH Positive 2022   •   • COVID - 19 status unknown    Plan:  • Patient will be admitted for delivery by Dr Sherron MAY, who will assume care and place  orders at this time.        I have spent 25 minutes including face to face time with the patient, greater than 50% in discussion of the diagnosis (counseling) and/or coordination of care.     Marc Mays MD  3/29/2023  13:28 EDT  OB Hospitalist  Phone:    [phone]

## 2023-03-29 NOTE — ANESTHESIA PROCEDURE NOTES
Labor Epidural      Patient reassessed immediately prior to procedure    Patient location during procedure: OB  Start Time: 3/29/2023 3:34 PM  Stop Time: 3/29/2023 3:42 PM  Performed By  Anesthesiologist: Angi Cagle MD  Preanesthetic Checklist  Completed: patient identified, IV checked, site marked, risks and benefits discussed, surgical consent, monitors and equipment checked, pre-op evaluation and timeout performed  Prep:  Pt Position:sitting  Sterile Tech:cap, gloves and mask  Prep:chlorhexidine gluconate and isopropyl alcohol  Monitoring:blood pressure monitoring and continuous pulse oximetry  Epidural Block Procedure:  Approach:midline  Guidance:landmark technique and palpation technique  Location:L3-L4  Needle Type:Tuohy  Needle Gauge:19 G  Loss of Resistance Medium: air (Mixed air and saline)  Loss of Resistance: 6cm  Cath Depth at skin:11 cm  Paresthesia: none  Aspiration:negative  Test Dose:negative  Number of Attempts: 1  Post Assessment:  Dressing:occlusive dressing applied and secured with tape  Pt Tolerance:patient tolerated the procedure well with no apparent complications  Complications:no

## 2023-03-29 NOTE — PLAN OF CARE
Problem: Adult Inpatient Plan of Care  Goal: Patient-Specific Goal (Individualized)  Outcome: Ongoing, Progressing  Flowsheets (Taken 3/29/2023 1514)  Patient-Specific Goals (Include Timeframe): vaginal delivery without epidural  Individualized Care Needs: pt wants to avoid epidural  Anxieties, Fears or Concerns: none   Goal Outcome Evaluation:  Plan of Care Reviewed With: patient, significant other        Progress: improving  Outcome Evaluation: pt admiited for labor, AROm per , pt wants to go without epidural, category 2 with decels, moderate variaiblity noted

## 2023-03-30 ENCOUNTER — PATIENT OUTREACH (OUTPATIENT)
Dept: LABOR AND DELIVERY | Facility: HOSPITAL | Age: 23
End: 2023-03-30
Payer: COMMERCIAL

## 2023-03-30 PROBLEM — Z34.90 PREGNANCY: Status: RESOLVED | Noted: 2021-09-10 | Resolved: 2023-03-30

## 2023-03-30 LAB
BASOPHILS # BLD AUTO: 0.04 10*3/MM3 (ref 0–0.2)
BASOPHILS NFR BLD AUTO: 0.3 % (ref 0–1.5)
DEPRECATED RDW RBC AUTO: 41.1 FL (ref 37–54)
EOSINOPHIL # BLD AUTO: 0.04 10*3/MM3 (ref 0–0.4)
EOSINOPHIL NFR BLD AUTO: 0.3 % (ref 0.3–6.2)
ERYTHROCYTE [DISTWIDTH] IN BLOOD BY AUTOMATED COUNT: 12.5 % (ref 12.3–15.4)
HCT VFR BLD AUTO: 33.6 % (ref 34–46.6)
HGB BLD-MCNC: 11.7 G/DL (ref 12–15.9)
IMM GRANULOCYTES # BLD AUTO: 0.07 10*3/MM3 (ref 0–0.05)
IMM GRANULOCYTES NFR BLD AUTO: 0.5 % (ref 0–0.5)
LYMPHOCYTES # BLD AUTO: 1.23 10*3/MM3 (ref 0.7–3.1)
LYMPHOCYTES NFR BLD AUTO: 9.1 % (ref 19.6–45.3)
MCH RBC QN AUTO: 31.2 PG (ref 26.6–33)
MCHC RBC AUTO-ENTMCNC: 34.8 G/DL (ref 31.5–35.7)
MCV RBC AUTO: 89.6 FL (ref 79–97)
MONOCYTES # BLD AUTO: 1.14 10*3/MM3 (ref 0.1–0.9)
MONOCYTES NFR BLD AUTO: 8.4 % (ref 5–12)
NEUTROPHILS NFR BLD AUTO: 10.99 10*3/MM3 (ref 1.7–7)
NEUTROPHILS NFR BLD AUTO: 81.4 % (ref 42.7–76)
NRBC BLD AUTO-RTO: 0 /100 WBC (ref 0–0.2)
PLATELET # BLD AUTO: 170 10*3/MM3 (ref 140–450)
PMV BLD AUTO: 10.7 FL (ref 6–12)
RBC # BLD AUTO: 3.75 10*6/MM3 (ref 3.77–5.28)
WBC NRBC COR # BLD: 13.51 10*3/MM3 (ref 3.4–10.8)

## 2023-03-30 PROCEDURE — 0503F POSTPARTUM CARE VISIT: CPT | Performed by: OBSTETRICS & GYNECOLOGY

## 2023-03-30 PROCEDURE — 85025 COMPLETE CBC W/AUTO DIFF WBC: CPT | Performed by: OBSTETRICS & GYNECOLOGY

## 2023-03-30 RX ADMIN — IBUPROFEN 600 MG: 600 TABLET, FILM COATED ORAL at 17:39

## 2023-03-30 RX ADMIN — DOCUSATE SODIUM 100 MG: 100 CAPSULE, LIQUID FILLED ORAL at 08:19

## 2023-03-30 RX ADMIN — Medication 1 TABLET: at 08:19

## 2023-03-30 RX ADMIN — Medication: at 22:46

## 2023-03-30 RX ADMIN — IBUPROFEN 600 MG: 600 TABLET, FILM COATED ORAL at 04:16

## 2023-03-30 RX ADMIN — DOCUSATE SODIUM 100 MG: 100 CAPSULE, LIQUID FILLED ORAL at 20:44

## 2023-03-30 NOTE — PLAN OF CARE
Goal Outcome Evaluation:  Plan of Care Reviewed With: patient        Progress: improving  Outcome Evaluation: VSS, breastfeeding well, pain controlled with PO meds. Ambulating independently.

## 2023-03-30 NOTE — OUTREACH NOTE
[facility]  IP Postpartum    Questions/Answers    Flowsheet Row Responses   Best Method for Contacting Cell   Support Person Present Yes   Does the patient have a car seat at the hospital Yes   Delivery Note Reviewed Reviewed   Were birth expectations met? Yes   Is there a need for additional support/resources? No   Lactation Note Reviewed Reviewed   Is additional support needed? No   Any questions or concerns? No   Is the patient going to use Meds to Beds? Yes   Any concerns related discharge meds/ability to  prescriptions? No   Confirm Postpartum OB appointment --  [reviewed ]   Confirm initial well-child Pediatrician appointment date/time: --  [reviewed ]   Does patient have transportation to appointments? Yes   Any other assistance needed to ensure she is able to attend appointments? No   Does patient have supplies needed at home for  care? Breast Pump, Clothing, Crib, Diapers, Formula        Pt reports that everything went well with delivery and that breastfeeding is going well. She now has breast pump and all necessary infant supplies. Reviewed f/u appts for mom and baby. Denies any resource needs at this time, f/u in one week.     Rosita Jolley RN  Maternity Nurse Navigator    3/30/2023, 13:14 EDT

## 2023-03-30 NOTE — PROGRESS NOTES
Assessment/Plan:  Status post Vaginal Delivery, PPD 1. Doing well postpatum.       Rh: No results found for: ABORH   O+  Rubella: Immune  Infant: Male, desires circumcision    Plan:  1.  Routine postpartum day 1 care.  Likely discharge home tomorrow.  2.  Patient and partner desire circumcision for baby. Risks, benefits, and alternatives discussed at length including the risks of pain, bleeding, infection, removing too much or too little foreskin, possible need for surgical revision in the future, injury to glans of the penis, injury to the urethra, scarring, abnormal sexual function, dissatisfaction with sexual function, and dissatisfaction with gender identity.   We discussed the options of delaying circumcision until the time in which the child would be able to provide consent on his own accord.  We discussed the options of not circumcising. Written educational materials on the indications for, risks, benefits, and alternatives to circumcision were provided to the patient prior to the procedure.  They verbalize understanding and wished to proceed with circumcision today. Circumcision performed without difficulty. See child's chart for details.         Subjective:  Postpartum Day 1: Vaginal Delivery    The patient feels well.Pain is well controlled with current medications. Urinary output is adequate. The patient is ambulating well. The patient is tolerating a normal diet. Flatus has been passed.    Objective:  Vital signs in last 24 hours:  Temp:  [97.6 °F (36.4 °C)-98.8 °F (37.1 °C)] 97.8 °F (36.6 °C)  Heart Rate:  [] 87  Resp:  [16-18] 16  BP: ()/(39-97) 95/90    I/O last 3 completed shifts:  In: 2123 [I.V.:2123]  Out: 1118 [Urine:800; Blood:318]  No intake/output data recorded.          General:    alert, appears stated age and cooperative   Uterine Fundus:   firm   Abdomen:  Soft, nontender, nondistended   DVT Evaluation:  No evidence of DVT seen on physical exam.     Lab Results   Component Value  Date    WBC 13.51 (H) 03/30/2023    HGB 11.7 (L) 03/30/2023    HCT 33.6 (L) 03/30/2023    MCV 89.6 03/30/2023     03/30/2023       Lab Results   Component Value Date    GLUCOSE 86 03/29/2023    BUN 8 03/29/2023    CREATININE 0.55 (L) 03/29/2023    EGFR 133.9 03/29/2023    BCR 14.5 03/29/2023    K 4.4 03/29/2023    CO2 23.0 03/29/2023    CALCIUM 8.7 03/29/2023    ALBUMIN 3.7 03/29/2023    BILITOT 0.3 03/29/2023    AST 17 03/29/2023    ALT 21 03/29/2023

## 2023-03-30 NOTE — PAYOR COMM NOTE
"PurdyHarleen mckeon (22 y.o. Female)     [facility]    [address] [place] [address]  Facility NPI: [id number]    Elmo Jolly  Fax: [phone]  Phone: [phone] (Sarah: [id number])    Subject: ADMISSION NOTIFICATION AUTH CLINICALS   Reference #: [id number]  Please don't hesitate to contact me with any questions or concerns.        Date of Birth   2000    Social Security Number       Address   [address] [facility] [address] [place] [address]    Home Phone   [phone]    MRN   [id number]       Mandaen   Patient Refused    Marital Status   Single                            Admission Date   3/29/23    Admission Type   Emergency    Admitting Provider   Marc Mays MD    Attending Provider   Ray Rojas MD    Department, Room/Bed   [facility], S308/1       Discharge Date       Discharge Disposition       Discharge Destination                               Attending Provider: Ray Rojas MD    Allergies: No Known Allergies    Isolation: None   Infection: None   Code Status: CPR    Ht: 170.2 cm (67\")   Wt: 69.4 kg (153 lb)    Admission Cmt: None   Principal Problem: Pregnancy [Z34.90]                 Active Insurance as of 3/29/2023     Primary Coverage     Payor Plan Insurance Group Employer/Plan Group    [employer] [place] [employer] [place]      Payor Plan Address Payor Plan Phone Number Payor Plan Fax Number Effective Dates    [address] [address] [phone]  9/9/2021 - None Entered    [facility] [id number]       Subscriber Name Subscriber Birth Date Member ID       HARLEEN PURDY 2000 [id number]                 Emergency Contacts      (Rel.) Home Phone Work Phone Mobile Phone    harvey levin (Significant Other) -- -- [phone]            Insurance Information                [facility] Phone: [phone]    Subscriber: Purdy Harleen Subscriber#: [id number]    Group#: -- Precert#: --           "   History & Physical      Aquiles Siu MD at 23 1455          H&P Note    Patient Identification:  Name: Harleen Purdy  Age: 22 y.o.  Sex: female  :  2000  MRN: [id number]                       Chief Complaint: Contractions    History of Present Illness:   22-year-old  2 para 1 presented at 39-0/7 weeks with painful contractions.  Active labor was confirmed on OB ED.  Group B strep status was negative.  Fetal heart tones are reassuring.    Problem List:  [unfilled]  Past Medical History:  History reviewed. No pertinent past medical history.  Past Surgical History:  Past Surgical History:   Procedure Laterality Date   • LEG SURGERY        Home Meds:  Medications Prior to Admission   Medication Sig Dispense Refill Last Dose   • ferrous sulfate 325 (65 FE) MG tablet Take 1 tablet by mouth Daily With Breakfast. 30 tablet 6 3/28/2023   • Prenatal 28-0.8 MG tablet Take 1 tablet by mouth Daily. Please use formulary or generic, with DHA ideal 90 each 3 3/28/2023   • promethazine (PHENERGAN) 25 MG tablet Take 1 tablet by mouth Every 6 (Six) Hours As Needed for Nausea or Vomiting. 30 tablet 2 Unknown     Current Meds:   [unfilled]  Allergies:  No Known Allergies  Immunizations:    There is no immunization history on file for this patient.  Social History:   Social History     Tobacco Use   • Smoking status: Former     Types: Cigarettes   • Smokeless tobacco: Never   Substance Use Topics   • Alcohol use: Not Currently      Family History:  Family History   Problem Relation Age of Onset   • No Known Problems Father    • No Known Problems Mother    • Breast cancer Neg Hx    • Ovarian cancer Neg Hx    • Uterine cancer Neg Hx    • Colon cancer Neg Hx    • Deep vein thrombosis Neg Hx    • Pulmonary embolism Neg Hx         Review of Systems  A comprehensive review of systems was negative.    Objective:  tMax 24 hrs: Temp (24hrs), Av.4 °F (36.9 °C), Min:98.4 °F (36.9 °C), Max:98.4 °F (36.9 °C)    Vitals  Ranges:   Temp:  [98.4 °F (36.9 °C)] 98.4 °F (36.9 °C)  Heart Rate:  [87-98] 87  Resp:  [18] 18  BP: (109-122)/(63-82) 111/63  Intake and Output Last 3 Shifts:   No intake/output data recorded.    Exam:     General Appearance:    Alert, cooperative, no distress, appears stated age   Head:    Normocephalic, without obvious abnormality, atraumatic   Back:     Symmetric, no curvature, ROM normal, no CVA tenderness   Lungs:     Clear to auscultation bilaterally, respirations unlabored   Chest Wall:    No tenderness or deformity    Heart:    Regular rate and rhythm, S1 and S2 normal, no murmur, rub   or gallop       Abdomen:    Soft, gravid.   Genitalia:   Cervix is completely effaced and 7 cm dilated.  Amniotomy performed with return of clear fluid   Rectal:   Not examined today   Extremities:  Equal in size   Skin:   Skin color, texture, turgor normal, no rashes or lesions   Lymph nodes:   Cervical, supraclavicular, and axillary nodes normal       Data Review:     Lab Results (last 24 hours)     Procedure Component Value Units Date/Time    Comprehensive Metabolic Panel [[id number]]  (Abnormal) Collected: 03/29/23 1318    Specimen: Blood Updated: 03/29/23 1349     Glucose 86 mg/dL      BUN 8 mg/dL      Creatinine 0.55 mg/dL      Sodium 140 mmol/L      Potassium 4.4 mmol/L      Chloride 106 mmol/L      CO2 23.0 mmol/L      Calcium 8.7 mg/dL      Total Protein 6.8 g/dL      Albumin 3.7 g/dL      ALT (SGPT) 21 U/L      AST (SGOT) 17 U/L      Alkaline Phosphatase 206 U/L      Total Bilirubin 0.3 mg/dL      Globulin 3.1 gm/dL      A/G Ratio 1.2 g/dL      BUN/Creatinine Ratio 14.5     Anion Gap 11.0 mmol/L      eGFR 133.9 mL/min/1.73     Narrative:      GFR Normal >60  Chronic Kidney Disease <60  Kidney Failure <15      CBC & Differential [[id number]]  (Abnormal) Collected: 03/29/23 1318    Specimen: Blood Updated: 03/29/23 1329    Narrative:      The following orders were created for panel order CBC & Differential.  Procedure                                Abnormality         Status                     ---------                               -----------         ------                     CBC Auto Differential[[id number]]        Abnormal            Final result                 Please view results for these tests on the individual orders.    CBC Auto Differential [[id number]]  (Abnormal) Collected: 23 1318    Specimen: Blood Updated: 23 1329     WBC 13.18 10*3/mm3      RBC 4.12 10*6/mm3      Hemoglobin 12.9 g/dL      Hematocrit 37.6 %      MCV 91.3 fL      MCH 31.3 pg      MCHC 34.3 g/dL      RDW 12.5 %      RDW-SD 41.2 fl      MPV 10.4 fL      Platelets 192 10*3/mm3      Neutrophil % 84.4 %      Lymphocyte % 7.4 %      Monocyte % 7.4 %      Eosinophil % 0.3 %      Basophil % 0.2 %      Immature Grans % 0.3 %      Neutrophils, Absolute 11.11 10*3/mm3      Lymphocytes, Absolute 0.98 10*3/mm3      Monocytes, Absolute 0.98 10*3/mm3      Eosinophils, Absolute 0.04 10*3/mm3      Basophils, Absolute 0.03 10*3/mm3      Immature Grans, Absolute 0.04 10*3/mm3      nRBC 0.0 /100 WBC         Assessment:    Pregnancy      1.  Intrauterine pregnancy at 39-0/7 weeks  2.  Active labor    Plan:  Anticipate a vaginal delivery    Aquiles Siu MD  3/29/2023    Electronically signed by Aquiles Siu MD at 23 1457         Facility-Administered Medications as of 3/30/2023   Medication Dose Route Frequency Provider Last Rate Last Admin   • acetaminophen (TYLENOL) tablet 650 mg  650 mg Oral Q6H PRN Aquiles Siu MD       • benzocaine (AMERICAINE) 20 % rectal ointment 1 application  1 application Rectal PRN Aquiles Siu MD       • benzocaine-menthol (DERMOPLAST) 20-0.5 % topical spray   Topical PRN Aquiles Siu MD       • bisacodyl (DULCOLAX) suppository 10 mg  10 mg Rectal Daily PRN Aquiles Siu MD       • docusate sodium (COLACE) capsule 100 mg  100 mg Oral BID Aquiles Siu MD   100 mg at 23 0819   • [] erythromycin  (ROMYCIN) 5 MG/GM ophthalmic ointment  - ADS Override Pull            • HYDROcodone-acetaminophen (NORCO) 5-325 MG per tablet 1 tablet  1 tablet Oral Q4H PRN Aquiles Siu MD        Or   • HYDROcodone-acetaminophen (NORCO)  MG per tablet 1 tablet  1 tablet Oral Q4H PRN Aquiles Siu MD       • Hydrocort-Pramoxine (Perianal) (PROCTOFOAM-HS) 1-1 % rectal foam 1 application  1 application Topical PRN Aquiles Siu MD       • Hydrocortisone (Perianal) (ANUSOL-HC) 2.5 % rectal cream 1 application  1 application Rectal PRN Aquiles Siu MD       • hydrOXYzine (ATARAX) tablet 50 mg  50 mg Oral Nightly PRN Aquiles Siu MD       • ibuprofen (ADVIL,MOTRIN) tablet 600 mg  600 mg Oral Q6H PRN Aquiles Siu MD   600 mg at 23 0416   • [COMPLETED] lactated ringers bolus 1,000 mL  1,000 mL Intravenous Once Angi Cagle MD 1,000 mL/hr at 23 1541 1,000 mL at 23 1541   • lanolin topical 1 application  1 application Topical Q1H PRN Aquiles Siu MD       • magnesium hydroxide (MILK OF MAGNESIA) suspension 10 mL  10 mL Oral Daily PRN Aquiles iSu MD       • methylergonovine (METHERGINE) injection 200 mcg  200 mcg Intramuscular Once PRN Aquiles Siu MD       • [COMPLETED] oxytocin (PITOCIN) 30 units in 0.9% sodium chloride 500 mL (premix)  999 mL/hr Intravenous Once Aquiles Siu  mL/hr at 23 1620 999 mL/hr at 23 1620    Followed by   • [] oxytocin (PITOCIN) 30 units in 0.9% sodium chloride 500 mL (premix)  250 mL/hr Intravenous Continuous Aquiles Siu MD   Stopped at 23 1748   • oxytocin (PITOCIN) 30 units in 0.9% sodium chloride 500 mL (premix)  125 mL/hr Intravenous Continuous PRN Aquiles Siu MD       • [COMPLETED] oxytocin (PITOCIN) 30 units in 0.9% sodium chloride 500 mL (premix)  125 mL/hr Intravenous Continuous PRN Aquiles Siu  mL/hr at 23 1748 125 mL/hr at 23 174   • [] phytonadione (VITAMIN K) 1 MG/0.5ML  injection  - ADS Override Pull            • prenatal vitamin tablet 1 tablet  1 tablet Oral Daily Aquiles Siu MD   1 tablet at 03/30/23 0819   • sodium chloride 0.9 % flush 1-10 mL  1-10 mL Intravenous PRN Aquiles Siu MD       • tranexamic acid 1000 mg in 100 mL 0.7% NaCl infusion (premix)  1,000 mg Intravenous Once PRN Aquiles Siu MD         Lab Results (last 72 hours)     Procedure Component Value Units Date/Time    CBC & Differential [[id number]]  (Abnormal) Collected: 03/30/23 0610    Specimen: Blood Updated: 03/30/23 0638    Narrative:      The following orders were created for panel order CBC & Differential.  Procedure                               Abnormality         Status                     ---------                               -----------         ------                     CBC Auto Differential[[id number]]        Abnormal            Final result                 Please view results for these tests on the individual orders.    CBC Auto Differential [[id number]]  (Abnormal) Collected: 03/30/23 0610    Specimen: Blood Updated: 03/30/23 0638     WBC 13.51 10*3/mm3      RBC 3.75 10*6/mm3      Hemoglobin 11.7 g/dL      Hematocrit 33.6 %      MCV 89.6 fL      MCH 31.2 pg      MCHC 34.8 g/dL      RDW 12.5 %      RDW-SD 41.1 fl      MPV 10.7 fL      Platelets 170 10*3/mm3      Neutrophil % 81.4 %      Lymphocyte % 9.1 %      Monocyte % 8.4 %      Eosinophil % 0.3 %      Basophil % 0.3 %      Immature Grans % 0.5 %      Neutrophils, Absolute 10.99 10*3/mm3      Lymphocytes, Absolute 1.23 10*3/mm3      Monocytes, Absolute 1.14 10*3/mm3      Eosinophils, Absolute 0.04 10*3/mm3      Basophils, Absolute 0.04 10*3/mm3      Immature Grans, Absolute 0.07 10*3/mm3      nRBC 0.0 /100 WBC     Comprehensive Metabolic Panel [[id number]]  (Abnormal) Collected: 03/29/23 1318    Specimen: Blood Updated: 03/29/23 1349     Glucose 86 mg/dL      BUN 8 mg/dL      Creatinine 0.55 mg/dL      Sodium 140 mmol/L      Potassium  4.4 mmol/L      Chloride 106 mmol/L      CO2 23.0 mmol/L      Calcium 8.7 mg/dL      Total Protein 6.8 g/dL      Albumin 3.7 g/dL      ALT (SGPT) 21 U/L      AST (SGOT) 17 U/L      Alkaline Phosphatase 206 U/L      Total Bilirubin 0.3 mg/dL      Globulin 3.1 gm/dL      A/G Ratio 1.2 g/dL      BUN/Creatinine Ratio 14.5     Anion Gap 11.0 mmol/L      eGFR 133.9 mL/min/1.73     Narrative:      GFR Normal >60  Chronic Kidney Disease <60  Kidney Failure <15      CBC & Differential [[id number]]  (Abnormal) Collected: 03/29/23 1318    Specimen: Blood Updated: 03/29/23 1329    Narrative:      The following orders were created for panel order CBC & Differential.  Procedure                               Abnormality         Status                     ---------                               -----------         ------                     CBC Auto Differential[[id number]]        Abnormal            Final result                 Please view results for these tests on the individual orders.    CBC Auto Differential [[id number]]  (Abnormal) Collected: 03/29/23 1318    Specimen: Blood Updated: 03/29/23 1329     WBC 13.18 10*3/mm3      RBC 4.12 10*6/mm3      Hemoglobin 12.9 g/dL      Hematocrit 37.6 %      MCV 91.3 fL      MCH 31.3 pg      MCHC 34.3 g/dL      RDW 12.5 %      RDW-SD 41.2 fl      MPV 10.4 fL      Platelets 192 10*3/mm3      Neutrophil % 84.4 %      Lymphocyte % 7.4 %      Monocyte % 7.4 %      Eosinophil % 0.3 %      Basophil % 0.2 %      Immature Grans % 0.3 %      Neutrophils, Absolute 11.11 10*3/mm3      Lymphocytes, Absolute 0.98 10*3/mm3      Monocytes, Absolute 0.98 10*3/mm3      Eosinophils, Absolute 0.04 10*3/mm3      Basophils, Absolute 0.03 10*3/mm3      Immature Grans, Absolute 0.04 10*3/mm3      nRBC 0.0 /100 WBC           Imaging Results (Last 72 Hours)     ** No results found for the last 72 hours. **        ECG/EMG Results (last 72 hours)     ** No results found for the last 72 hours. **        Orders (last  72 hrs)      Start     Ordered    03/30/23 0800  Sitz Bath  3 Times Daily        Comments: PRN    03/29/23 1901    03/30/23 0600  CBC & Differential  Morning Draw        Comments: Postpartum Day 1      03/29/23 1901    03/30/23 0600  CBC Auto Differential  PROCEDURE ONCE        Comments: Postpartum Day 1      03/29/23 2205    03/30/23 0000  bisacodyl (DULCOLAX) suppository 10 mg  Daily PRN         03/29/23 1901    03/29/23 2100  docusate sodium (COLACE) capsule 100 mg  2 Times Daily         03/29/23 1901    03/29/23 2000  prenatal vitamin tablet 1 tablet  Daily         03/29/23 1901    03/29/23 1902  Code Status and Medical Interventions:  Continuous         03/29/23 1901    03/29/23 1902  Vital Signs Per Hospital Policy  Per Hospital Policy         03/29/23 1901    03/29/23 1902  Notify Physician  Until Discontinued         03/29/23 1901    03/29/23 1902  Up Ad Norma  Until Discontinued         03/29/23 1901    03/29/23 1902  Ambulate Patient  Every Shift       03/29/23 1901    03/29/23 1902  Advance Diet As Tolerated -Regular  Until Discontinued         03/29/23 1901    03/29/23 1902  Fundal and Lochia Check  Per Order Details        Comments: Every 30 minutes x2, then Every Shift    03/29/23 1901    03/29/23 1902  RN to Assess Rh Status & Place RhIG Evaluation Order if Indicated  Continuous         03/29/23 1901    03/29/23 1902  Bladder Assessment  Per Order Details        Comments: Postpartum 1) Upon Admission to Unit & Every 4 Hours PRN Until Voiding. 2) Out of Bed to Void in 8 Hours.    03/29/23 1901    03/29/23 1902  Straight Cath  Per Order Details        Comments: Postpartum: If Distended & Unable to Void, May Repeat Once.    03/29/23 1901 03/29/23 1902  Indwelling Urinary Catheter  Per Order Details        Comments: Postpartum : After Straight Cathed x2 or if Greater Than 1000mL Residual, Insert Indwelling Urinary Catheter Until Further MD Order.    03/29/23 1901    03/29/23 1902  Remove Vaginal Packing   Once         03/29/23 1901    03/29/23 1902  Apply Ice to Perineum  Per Order Details        Comments: For 20 Minutes Every 2 Hours    03/29/23 1901    03/29/23 1902  Waffle Cushion  Per Order Details        Comments: For Perineal Discomfort    03/29/23 1901    03/29/23 1902  Donut Ring  Per Order Details        Comments: For Perineal Pain    03/29/23 1901    03/29/23 1902  Kpad  Per Order Details        Comments: For Pain    03/29/23 1901    03/29/23 1902  Breast pump to bed  Once         03/29/23 1901    03/29/23 1902  If indicated -- Please administer RH Immunoglobulin based on results of cord blood evaluation and fetal screen lab tests, pharmacy to dispense  Per Order Details        Comments: See Process Instructions For Reference Range Details.    03/29/23 1901 03/29/23 1902  Place Sequential Compression Device  Once         03/29/23 1901 03/29/23 1902  Maintain Sequential Compression Device  Continuous         03/29/23 1901 03/29/23 1901  sodium chloride 0.9 % flush 1-10 mL  As Needed         03/29/23 1901 03/29/23 1901  magnesium hydroxide (MILK OF MAGNESIA) suspension 10 mL  Daily PRN         03/29/23 1901 03/29/23 1901  benzocaine-menthol (DERMOPLAST) 20-0.5 % topical spray  As Needed         03/29/23 1901 03/29/23 1901  Hydrocort-Pramoxine (Perianal) (PROCTOFOAM-HS) 1-1 % rectal foam 1 application  As Needed         03/29/23 1901 03/29/23 1901  Hydrocortisone (Perianal) (ANUSOL-HC) 2.5 % rectal cream 1 application  As Needed         03/29/23 1901 03/29/23 1901  benzocaine (AMERICAINE) 20 % rectal ointment 1 application  As Needed         03/29/23 1901 03/29/23 1901  oxytocin (PITOCIN) 30 units in 0.9% sodium chloride 500 mL (premix)  Continuous PRN         03/29/23 1901 03/29/23 1901  methylergonovine (METHERGINE) injection 200 mcg  Once As Needed         03/29/23 1901 03/29/23 1901  hydrOXYzine (ATARAX) tablet 50 mg  Nightly PRN         03/29/23 1901 03/29/23 1901   lanolin topical 1 application  Every 1 Hour PRN         03/29/23 1901    03/29/23 1720  Transfer Patient  Once         03/29/23 1719    03/29/23 1719  oxytocin (PITOCIN) 30 units in 0.9% sodium chloride 500 mL (premix)  Continuous PRN         03/29/23 1719    03/29/23 1719  tranexamic acid 1000 mg in 100 mL 0.7% NaCl infusion (premix)  Once As Needed         03/29/23 1719    03/29/23 1719  ibuprofen (ADVIL,MOTRIN) tablet 600 mg  Every 6 Hours PRN         03/29/23 1719    03/29/23 1719  acetaminophen (TYLENOL) tablet 650 mg  Every 6 Hours PRN         03/29/23 1719    03/29/23 1719  HYDROcodone-acetaminophen (NORCO) 5-325 MG per tablet 1 tablet  Every 4 Hours PRN        See Hyperspace for full Linked Orders Report.    03/29/23 1719    03/29/23 1719  HYDROcodone-acetaminophen (NORCO)  MG per tablet 1 tablet  Every 4 Hours PRN        See Hyperspace for full Linked Orders Report.    03/29/23 1719    03/29/23 1639  Notify Provider (Specified)  Until Discontinued         03/29/23 1638    03/29/23 1639  Vital Signs Per Hospital Policy  Per Hospital Policy         03/29/23 1638    03/29/23 1639  Fundal & Lochia Check  Per Order Details        Comments: Every 15 Minutes x4, Then Every 30 Minutes x2, Then Every Shift    03/29/23 1638    03/29/23 1639  Fundal & Lochia Check  Every Shift       03/29/23 1638    03/29/23 1639  Diet: Regular/House Diet; Texture: Regular Texture (IDDSI 7); Fluid Consistency: Thin (IDDSI 0)  Diet Effective Now         03/29/23 1638    03/29/23 1639  Advance Diet As Tolerated -  Until Discontinued         03/29/23 1638    03/29/23 1639  Transfer to Postpartum When Criteria Met  Continuous         03/29/23 1638    03/29/23 1600  lactated ringers bolus 1,000 mL  Once         03/29/23 1503    03/29/23 1600  fentaNYL 2mcg/mL and ropivacaine 0.2% in NS epidural 100mL  Continuous,   Status:  Discontinued         03/29/23 1503    03/29/23 1530  oxytocin (PITOCIN) 30 units in 0.9% sodium chloride 500 mL  (premix)  Continuous        See Hyperspace for full Linked Orders Report.    03/29/23 1424    03/29/23 1503  Vital Signs Per Anesthesia Guidelines  Per Order Details,   Status:  Canceled        Comments: Every 2 Minutes x5, Every 5 Minutes x4, Then If Stable Every 15 Minutes    03/29/23 1503    03/29/23 1503  Start IV (16 or 18 Gauge)  Once,   Status:  Canceled         03/29/23 1503    03/29/23 1503  Cardiac Monitoring  Continuous,   Status:  Canceled         03/29/23 1503    03/29/23 1503  Fetal Heart Rate Monitor  Once,   Status:  Canceled         03/29/23 1503    03/29/23 1503  Nurse or Anesthesiologist to Remain With Patient for 15 Minutes Following Dosing  Continuous,   Status:  Canceled         03/29/23 1503    03/29/23 1503  Facilitate Maternal Position on Side & Maintain Uterine Displacement  Continuous,   Status:  Canceled         03/29/23 1503    03/29/23 1503  Consult Anesthesia Prior to Changing Epidural Infusion / Rate  Continuous,   Status:  Canceled         03/29/23 1503    03/29/23 1503  Notify Provider  Until Discontinued,   Status:  Canceled         03/29/23 1503    03/29/23 1502  ePHEDrine injection 5 mg  Every 10 Minutes PRN,   Status:  Discontinued         03/29/23 1503    03/29/23 1502  ondansetron (ZOFRAN) injection 4 mg  Once As Needed,   Status:  Discontinued         03/29/23 1503    03/29/23 1501  phytonadione (VITAMIN K) 1 MG/0.5ML injection  - ADS Override Pull        Note to Pharmacy: Created by cabinet override    03/29/23 1501    03/29/23 1501  erythromycin (ROMYCIN) 5 MG/GM ophthalmic ointment  - ADS Override Pull        Note to Pharmacy: Created by cabinet override    03/29/23 1501    03/29/23 1449  Inpatient Case Management  Consult  Once,   Status:  Canceled        Provider:  (Not yet assigned)    03/29/23 1448    03/29/23 1424  oxytocin (PITOCIN) 30 units in 0.9% sodium chloride 500 mL (premix)  Once        See Hyperspace for full Linked Orders Report.    03/29/23  1424    03/29/23 1424  methylergonovine (METHERGINE) injection 200 mcg  Once As Needed,   Status:  Discontinued         03/29/23 1424    03/29/23 1424  carboprost (HEMABATE) injection 250 mcg  Every 15 Minutes PRN,   Status:  Discontinued         03/29/23 1424    03/29/23 1424  miSOPROStol (CYTOTEC) tablet 800 mcg  Once As Needed,   Status:  Discontinued         03/29/23 1424    03/29/23 1400  sodium chloride 0.9 % flush 10 mL  Every 12 Hours Scheduled,   Status:  Discontinued         03/29/23 1310    03/29/23 1400  lactated ringers infusion  Continuous,   Status:  Discontinued         03/29/23 1310    03/29/23 1311  Code Status and Medical Interventions:  Continuous,   Status:  Canceled         03/29/23 1310    03/29/23 1311  Obtain Informed Consent  Once         03/29/23 1310    03/29/23 1311  Vital Signs Per Hospital Policy  Per Hospital Policy,   Status:  Canceled         03/29/23 1310    03/29/23 1311  Mini-Prep Prior to Delivery  Once,   Status:  Canceled         03/29/23 1310    03/29/23 1311  Continuous Fetal Monitoring With NST on Admission and Prior to Initiation of Oxytocin.  Per Order Details,   Status:  Canceled        Comments: Continuous Fetal Monitoring With NST on Admission & Prior to Initiation of Oxytocin.    03/29/23 1310    03/29/23 1311  External Uterine Contraction Monitoring  Per Hospital Policy,   Status:  Canceled         03/29/23 1310    03/29/23 1311  Notify Provider (Specified)  Until Discontinued,   Status:  Canceled         03/29/23 1310    03/29/23 1311  Notify Provider of Tachysystole (Per Hospital Algorithm)  Until Discontinued,   Status:  Canceled         03/29/23 1310    03/29/23 1311  Notify Provider if Membranes Ruptured, Bleeding Greater Than 1 Pad Per Hour, Fetal Heart Tone Abnormality or Severe Pain  Until Discontinued,   Status:  Canceled         03/29/23 1310    03/29/23 1311  Initiate Group Beta Strep (GBS) Prophylaxis Protocol, If Criteria Met  Continuous,   Status:   Canceled        Comments: NO TREATMENT RECOMMENDED IF: 1) Maternal GBS Status Known Negative 2) Scheduled  Birth With Intact Membranes, Not in Labor 3) Maternal GBS Status Unknown, No Risk Factors  TREAT WITH ANTIBIOTICS IF:  1) Maternal GBS Status Known Positive 2) Maternal GBS Status Unknown With Risk Factors: a)  Previous Infant Affected By GBS Infection b) GBS Urinary Tract Infection (UTI) or Bacteriuria During Pregnancy c) Unexplained Maternal Fever (100.4F (38C) or Greater) During Labor d)  Prolonged Rupture of Membranes (18 or More Hours) e) Gestational Age Less Than 37 Weeks    23 1310    23 1311  NPO Diet NPO Type: Ice Chips  Diet Effective Now,   Status:  Canceled         23 1310    23 1311  Inpatient Anesthesiology Consult  Once,   Status:  Canceled        Specialty:  Anesthesiology  Provider:  (Not yet assigned)    23 1310    23 1311  CBC & Differential  Once         23 1310    23 1311  Comprehensive Metabolic Panel  Once         23 1310    23 1311  Type & Screen  Once         23 1310    23 1311  Insert Peripheral IV  Once         23 1310    23 1311  Saline Lock & Maintain IV Access  Continuous,   Status:  Canceled         23 1310    23 1311  CBC Auto Differential  PROCEDURE ONCE         23 1311    23 1310  Position Change - For Intra-Uterine Resusitation for Hypertonus, HyperStimulation or Non-Reassuring Fetal Status  As Needed,   Status:  Canceled       23 1310    23 1310  lidocaine PF 1% (XYLOCAINE) injection 5 mL  As Needed,   Status:  Discontinued         23 1310    23 1310  sodium chloride 0.9 % flush 10 mL  As Needed,   Status:  Discontinued         23 1310    23 1310  lactated ringers bolus 1,000 mL  Once As Needed,   Status:  Discontinued         23 1310    23 1310  acetaminophen (TYLENOL) tablet 650 mg  Every 4 Hours PRN,   Status:   Discontinued         03/29/23 1310    03/29/23 1310  butorphanol (STADOL) injection 2 mg  Every 3 Hours PRN,   Status:  Discontinued         03/29/23 1310    03/29/23 1310  ondansetron (ZOFRAN) tablet 4 mg  Every 6 Hours PRN,   Status:  Discontinued        See Hyperspace for full Linked Orders Report.    03/29/23 1310    03/29/23 1310  ondansetron (ZOFRAN) injection 4 mg  Every 6 Hours PRN,   Status:  Discontinued        See Hyperspace for full Linked Orders Report.    03/29/23 1310    03/29/23 1310  terbutaline (BRETHINE) injection 0.25 mg  As Needed,   Status:  Discontinued         03/29/23 1310    03/29/23 1310  mineral oil liquid 30 mL  Once As Needed,   Status:  Discontinued         03/29/23 1310    03/29/23 1310  famotidine (PEPCID) injection 20 mg  2 Times Daily PRN,   Status:  Discontinued        See Hyperspace for full Linked Orders Report.    03/29/23 1310    03/29/23 1310  famotidine (PEPCID) tablet 20 mg  2 Times Daily PRN,   Status:  Discontinued        See Hyperspace for full Linked Orders Report.    03/29/23 1310    03/29/23 1306  Admit To Obstetrics Inpatient  Once         03/29/23 1307    03/29/23 1306  VTE Prophylaxis Not Indicated: No Risk Factors (0); </= 3 (Low Risk)  Once         03/29/23 1307    03/29/23 1247  Vital Signs  Per Hospital Policy,   Status:  Canceled        Comments: Repeat blood pressure every 30 minutes, until specified.    03/29/23 1246    03/29/23 1247  Fetal Nonstress Test  Once,   Status:  Canceled        Comments: For any patient >= 24 wks gestation.    03/29/23 1246    03/29/23 1247  Pulse Oximetry, Spot  Once,   Status:  Canceled         03/29/23 1246    03/29/23 1247  Urinalysis With Culture If Indicated - Urine, Clean Catch  Once,   Status:  Canceled        Comments: Collect and Hold for gestational age > 24 weeks      03/29/23 1246    03/29/23 1247  Obtain Fetal Heart Rate - For Gestational Age <24 weeks  Once,   Status:  Canceled         03/29/23 1246    03/29/23 1247   Continuous Uterine Monitoring - For Gestational Age >24 weeks  Once,   Status:  Canceled         23 1246    23 1247  NPO Diet NPO Type: Strict NPO  Diet Effective Now,   Status:  Canceled         23 1246    23 1247  Vaginal Exam  Once,   Status:  Canceled        Comments: Perform unless patient has vaginal bleeding without known placental site, known placental previa, <36 weeks with no abdominal , or complain of ROM and <36 weeks    23 124    Unscheduled  Apply witch hazel pads / TUCKS to perineum as needed for comfort PRN  As Needed       23    Unscheduled  Warm compress  As Needed       23    Unscheduled  Apply ace wrap, tight bra, or binder  As Needed       23    Unscheduled  Apply ice packs  As Needed       23                   Operative/Procedure Notes (last 72 hours)      Aquiles Siu MD at 23 1628        Delivery Note    Obstetrician:   Aquiles Siu MD      Pre-Delivery Diagnosis: 1.  Intrauterine pregnancy at 39-0/7 weeks  2.  Active labor    Post-Delivery Diagnosis: Same    Procedure: Spontaneous vaginal delivery     Episiotomy or Incision: none      Old  2 para 1 presented at 39-0/7 weeks in active labor.  Group B strep status was negative.  Fetal heart tones were reactive.      The patient was admitted.  Amniotomy was performed at 7 cm with return of clear fluid.  An epidural catheter was placed at 8 to 9 cm.  The patient progressed along an adequate labor curve to complete effacement of dilatation as well as +2 station of the presenting part.  She felt a strong urge to push.      The patient pushed to spontaneous vaginal delivery of the fetal head from direct occiput anterior presentation.  There was a nuchal cord x1.  It was reduced.  The shoulders were delivered without difficulty, followed by the remainder of the infant.  Mouth and nares were suctioned with a bulb after delivery.  The infant was then placed  on the mother's chest for Kangaroo care.  This is a vigorous male with Apgars of 8 and 9.      The perineum was inspected.  It was intact.  The placenta  spontaneously.  It was intact and had a three-vessel cord.  The cervix was examined.  It was intact as well.          Apgars: 1' 8  /  5' 9    Placenta and Cord:          Mechanism: spontaneous        Description:  complete    Estimated Blood Loss:  318cc                             Complications:  None           Condition: Stable    Male       3/29/2023  Aquiles Siu MD    Electronically signed by Aquiles Siu MD at 23 1701          Physician Progress Notes (last 72 hours)      Stephen Wilburn MD at 23 1352          Assessment/Plan:  Status post Vaginal Delivery, PPD 1. Doing well postpatum.       Rh: No results found for: ABORH   O+  Rubella: Immune  Infant: Male, desires circumcision    Plan:  1.  Routine postpartum day 1 care.  Likely discharge home tomorrow.  2.  Patient and partner desire circumcision for baby. Risks, benefits, and alternatives discussed at length including the risks of pain, bleeding, infection, removing too much or too little foreskin, possible need for surgical revision in the future, injury to glans of the penis, injury to the urethra, scarring, abnormal sexual function, dissatisfaction with sexual function, and dissatisfaction with gender identity.   We discussed the options of delaying circumcision until the time in which the child would be able to provide consent on his own accord.  We discussed the options of not circumcising. Written educational materials on the indications for, risks, benefits, and alternatives to circumcision were provided to the patient prior to the procedure.  They verbalize understanding and wished to proceed with circumcision today. Circumcision performed without difficulty. See child's chart for details.         Subjective:  Postpartum Day 1: Vaginal  Delivery    The patient feels well.Pain is well controlled with current medications. Urinary output is adequate. The patient is ambulating well. The patient is tolerating a normal diet. Flatus has been passed.    Objective:  Vital signs in last 24 hours:  Temp:  [97.6 °F (36.4 °C)-98.8 °F (37.1 °C)] 97.8 °F (36.6 °C)  Heart Rate:  [] 87  Resp:  [16-18] 16  BP: ()/(39-97) 95/90    I/O last 3 completed shifts:  In: 2123 [I.V.:2123]  Out: 1118 [Urine:800; Blood:318]  No intake/output data recorded.          General:    alert, appears stated age and cooperative   Uterine Fundus:   firm   Abdomen:  Soft, nontender, nondistended   DVT Evaluation:  No evidence of DVT seen on physical exam.     Lab Results   Component Value Date    WBC 13.51 (H) 03/30/2023    HGB 11.7 (L) 03/30/2023    HCT 33.6 (L) 03/30/2023    MCV 89.6 03/30/2023     03/30/2023       Lab Results   Component Value Date    GLUCOSE 86 03/29/2023    BUN 8 03/29/2023    CREATININE 0.55 (L) 03/29/2023    EGFR 133.9 03/29/2023    BCR 14.5 03/29/2023    K 4.4 03/29/2023    CO2 23.0 03/29/2023    CALCIUM 8.7 03/29/2023    ALBUMIN 3.7 03/29/2023    BILITOT 0.3 03/29/2023    AST 17 03/29/2023    ALT 21 03/29/2023         Electronically signed by Stephen Wilburn MD at 03/30/23 1354       Consult Notes (last 72 hours)  Notes from 03/27/23 1441 through 03/30/23 1441   No notes of this type exist for this encounter.

## 2023-03-30 NOTE — LACTATION NOTE
P2, T, new admission.  LC rounded on patient at this time.  Informed patient LC is here to help with breastfeeding tonight.  Patient declined, will call for help when infant is ready to feed.  Patient stated she breast fed her first child for 8 months.  Educated patient to attempt to feed infant every 3 hours or on demand and to offer each breast when feeding.  Patient verbalized understanding.  Patient unable to order PBP before delivery, Rx faxed for Medela pump.  LC number on white board and encouraged to call with any questions.

## 2023-03-30 NOTE — LACTATION NOTE
"This note was copied from a baby's chart.  P2T - baby 17 hrs old.  Mom has baby undressed down to his diaper lying on the bed in front of her & he is wiggling & rousing.  She reports \"I think he's taking to (BF'g) really well.\"  She feels like he is latching well, feeding more frequently than q2-3hrs & having more wet diapers so far in this first 24hrs than the minimum.  Dispensed Medela personal breast pump.  She denies questions/concerns.  LC names/#'s placed on WB & encouraged to call for help when needed.  Verbalized understanding.  "

## 2023-03-31 VITALS
DIASTOLIC BLOOD PRESSURE: 64 MMHG | WEIGHT: 153 LBS | HEART RATE: 82 BPM | BODY MASS INDEX: 24.01 KG/M2 | RESPIRATION RATE: 16 BRPM | SYSTOLIC BLOOD PRESSURE: 103 MMHG | OXYGEN SATURATION: 99 % | HEIGHT: 67 IN | TEMPERATURE: 98.1 F

## 2023-03-31 PROCEDURE — 0503F POSTPARTUM CARE VISIT: CPT | Performed by: NURSE PRACTITIONER

## 2023-03-31 RX ORDER — PSEUDOEPHEDRINE HCL 30 MG
100 TABLET ORAL 2 TIMES DAILY
Qty: 30 CAPSULE | Refills: 0 | Status: SHIPPED | OUTPATIENT
Start: 2023-03-31

## 2023-03-31 RX ORDER — IBUPROFEN 600 MG/1
600 TABLET ORAL EVERY 6 HOURS PRN
Qty: 90 TABLET | Refills: 1 | Status: SHIPPED | OUTPATIENT
Start: 2023-03-31

## 2023-03-31 RX ADMIN — IBUPROFEN 600 MG: 600 TABLET, FILM COATED ORAL at 02:23

## 2023-03-31 RX ADMIN — DOCUSATE SODIUM 100 MG: 100 CAPSULE, LIQUID FILLED ORAL at 08:12

## 2023-03-31 RX ADMIN — Medication 1 TABLET: at 08:12

## 2023-03-31 NOTE — LACTATION NOTE
"This note was copied from a baby's chart.  P2T - going home today.  Mom reports BF'g is \"Still going well\" this morning.  She reports infant feeds q1-3hrs & having adequate diapers.    Education provided:  infant weight loss & return to birth weight in 10-14 days (about 2 weeks); Pediatrician to follow infant’s weight; infant should be feeding at minimum 8 times/24 hours; expect infant to have at least 2 yellow poops by day 5 & at least 6 pees every day starting on day 5; expect full milk supply between 3-5 days after delivery; milk storage; engorgement management & duration; & availability of outpatient Lactation Services.  Referred to breastfeeding education in “Postpartum & [place] Care Guide” at bedside.  Verbalized understanding.     Mother denies questions/concerns at this time.  Encouraged to call for help when needed.  LC # on WB.     "

## 2023-03-31 NOTE — PROGRESS NOTES
Postpartum Progress Note      Status post Vaginal Delivery: Doing well postoperatively.     1) Postpartum care immediately following delivery :    Routine care, discharge home today. Reviewed discharge instructions with pt in detail    Rh status: O+  Rubella: Immune  Gender: Male, s/p circumcision     Subjective     Postpartum Day 2: Vaginal delivery    The patient feels well. The patient denies emotional concerns. Pain is well controlled with current medications. The baby is well. The patient is ambulating well. The patient is tolerating a normal diet.     Objective     Vital signs in last 24 hours:  Temp:  [97.9 °F (36.6 °C)-98.3 °F (36.8 °C)] 98.1 °F (36.7 °C)  Heart Rate:  [74-85] 82  Resp:  [16] 16  BP: ()/(45-64) 103/64      General:    alert, appears stated age and cooperative   CV: RRR, no m/r/g   Lungs: CTAB   Abdomen:  Soft, Non-tender    Lochia:  appropriate   Uterine Fundus:   firm   Ext    Edema trace   DVT Evaluation:  No evidence of DVT seen on physical exam.     Lab Results   Component Value Date    WBC 13.51 (H) 03/30/2023    HGB 11.7 (L) 03/30/2023    HCT 33.6 (L) 03/30/2023    MCV 89.6 03/30/2023     03/30/2023       Dayna Vora, DELPHINE  3/31/2023  09:29 EDT

## 2023-04-10 ENCOUNTER — PATIENT OUTREACH (OUTPATIENT)
Dept: LABOR AND DELIVERY | Facility: HOSPITAL | Age: 23
End: 2023-04-10
Payer: COMMERCIAL

## 2023-04-10 NOTE — OUTREACH NOTE
[facility]  Unable to Reach       Questions/Answers    Flowsheet Row Responses   Pending Outreach Postpartum Check-in   Call Attempt First   Outcome Left message   Next Call Attempt Date 04/12/23            Rosita HARPER - RN  Maternity Nurse Navigator    4/10/2023, 11:26 EDT

## 2023-04-12 ENCOUNTER — PATIENT OUTREACH (OUTPATIENT)
Dept: LABOR AND DELIVERY | Facility: HOSPITAL | Age: 23
End: 2023-04-12
Payer: COMMERCIAL

## 2023-04-12 ENCOUNTER — TELEPHONE (OUTPATIENT)
Dept: OBSTETRICS AND GYNECOLOGY | Facility: CLINIC | Age: 23
End: 2023-04-12
Payer: COMMERCIAL

## 2023-04-12 NOTE — OUTREACH NOTE
[facility]  Unable to Reach       Questions/Answers    Flowsheet Row Responses   Pending Outreach Postpartum Check-in   Call Attempt Second   Outcome Left message        UTR for PP check in, will send to RN call center.     Rosita HARPER - RN  Maternity Nurse Navigator    4/12/2023, 11:19 EDT

## 2023-04-19 ENCOUNTER — PATIENT OUTREACH (OUTPATIENT)
Dept: CALL CENTER | Facility: HOSPITAL | Age: 23
End: 2023-04-19
Payer: COMMERCIAL

## 2023-04-19 NOTE — OUTREACH NOTE
Motherhood Connection Survey    Flowsheet Row Responses   [facility] facility patient discharged from? [place]   Week 1 attempt successful? No   Unsuccessful attempts Attempt 1   Reschedule Today            Nida HARPER - Licensed Nurse

## 2023-04-19 NOTE — OUTREACH NOTE
Motherhood Connection Survey    Flowsheet Row Responses   [facility] facility patient discharged from? [place]   Week 1 attempt successful? No   Unsuccessful attempts Attempt 2   Reschedule Tomorrow            Nida HARPER - Licensed Nurse

## 2023-04-20 ENCOUNTER — PATIENT OUTREACH (OUTPATIENT)
Dept: CALL CENTER | Facility: HOSPITAL | Age: 23
End: 2023-04-20
Payer: COMMERCIAL

## 2023-04-20 NOTE — OUTREACH NOTE
Motherhood Connection Survey    Flowsheet Row Responses   [facility]   [facility] attempt successful? No   Unsuccessful attempts Attempt 3            Flakita HARPER - Registered Nurse

## 2023-05-15 ENCOUNTER — TELEPHONE (OUTPATIENT)
Dept: OBSTETRICS AND GYNECOLOGY | Facility: CLINIC | Age: 23
End: 2023-05-15
Payer: COMMERCIAL

## 2023-05-15 NOTE — TELEPHONE ENCOUNTER
PT STATES SHE HAS DELIVERED HER BABY 3/29/23 AND STATES SHE DID NOT HAVE HER POSTPARTUM VISIT. PT IS NEEDING A NOTE FROM DR YE STATING PT CAN RETURN BACK TO WORK, PLEASE ADVISE PT IF SHE NEEDS TO BE SEEN, PT DENIES HAVING ANY ISSUES. [phone]

## 2023-05-15 NOTE — TELEPHONE ENCOUNTER
HUB call.  3/29/23. No show 4/10/23. She did not have her PP visit. Needing a return to work note. Thank you.

## 2023-05-15 NOTE — TELEPHONE ENCOUNTER
Analisa,     Should have follow up for postpartum arranged. Then okay to give her note for return to work.     Thanks,   Dr. Rojas

## 2023-05-16 NOTE — TELEPHONE ENCOUNTER
Left message for Harleen that Dr Rojas said you need to have a follow up PP appt. I was calling to help you to get scheduled. He said that then it will be ok to give you a note to return to work. I also need to know what date it needs to be. Please call the office [phone].

## 2023-11-07 ENCOUNTER — TELEPHONE (OUTPATIENT)
Dept: OBSTETRICS AND GYNECOLOGY | Facility: CLINIC | Age: 23
End: 2023-11-07
Payer: COMMERCIAL

## 2023-12-02 NOTE — LACTATION NOTE
This note was copied from a baby's chart.  P2, T, rounded to let parents know LC here until 0700. She was bf in side lying position and said everything was going well. I asked her to call me if she needed any help. Has personal pump.    Speaking Coherently No

## 2025-04-02 ENCOUNTER — OFFICE VISIT (OUTPATIENT)
Dept: OBSTETRICS AND GYNECOLOGY | Facility: CLINIC | Age: 25
End: 2025-04-02
Payer: COMMERCIAL

## 2025-04-02 VITALS
BODY MASS INDEX: 21.03 KG/M2 | WEIGHT: 134 LBS | DIASTOLIC BLOOD PRESSURE: 70 MMHG | SYSTOLIC BLOOD PRESSURE: 118 MMHG | HEIGHT: 67 IN

## 2025-04-02 DIAGNOSIS — N89.8 VAGINAL DISCHARGE: ICD-10-CM

## 2025-04-02 DIAGNOSIS — Z11.3 SCREENING FOR STD (SEXUALLY TRANSMITTED DISEASE): ICD-10-CM

## 2025-04-02 DIAGNOSIS — R35.0 URINARY FREQUENCY: Primary | ICD-10-CM

## 2025-04-02 DIAGNOSIS — N89.8 VAGINAL ODOR: ICD-10-CM

## 2025-04-02 LAB
BILIRUB BLD-MCNC: NEGATIVE MG/DL
GLUCOSE UR STRIP-MCNC: NEGATIVE MG/DL
KETONES UR QL: NEGATIVE
LEUKOCYTE EST, POC: NEGATIVE
NITRITE UR-MCNC: NEGATIVE MG/ML
PH UR: 5.5 [PH] (ref 5–8)
PROT UR STRIP-MCNC: NEGATIVE MG/DL
RBC # UR STRIP: NEGATIVE /UL
SP GR UR: 1 (ref 1–1.03)
UROBILINOGEN UR QL: NORMAL

## 2025-04-02 RX ORDER — DIPHENOXYLATE HYDROCHLORIDE AND ATROPINE SULFATE 2.5; .025 MG/1; MG/1
1 TABLET ORAL DAILY
COMMUNITY

## 2025-04-02 RX ORDER — METRONIDAZOLE 500 MG/1
500 TABLET ORAL 2 TIMES DAILY
Qty: 14 TABLET | Refills: 0 | Status: SHIPPED | OUTPATIENT
Start: 2025-04-02 | End: 2025-04-09

## 2025-04-02 NOTE — PROGRESS NOTES
"Chief Complaint   Patient presents with    Follow-up     Pt here today due to urinary frequency and urgency      SUBJECTIVE:     Harleen Purdy is a 24 y.o.  who presents with c/o urinary frequency, urinary urgency and a fish like vaginal odor for 1 month. C/o cloudy urine. She denies current dysuria, but did experience the first days. She has tried to increase her intake of water with no improvement. Denies vaginal itching or discharge. Denies a change in soaps, hygiene products. She is not using douches. Denies pelvic pain.  Denies fevers, chills, body aches, or N&V. This is a new problem. LMP 3/7/25. She has  a new partner. She thinks she may have UTI or BV infection.     History reviewed. No pertinent past medical history.   Past Surgical History:   Procedure Laterality Date    LEG SURGERY          Review of Systems   Constitutional:  Negative for chills, fatigue and fever.   Gastrointestinal:  Negative for abdominal distention and abdominal pain.   Genitourinary:  Positive for frequency and urgency. Negative for dyspareunia, dysuria, menstrual problem, pelvic pain, vaginal bleeding, vaginal discharge and vaginal pain.        + vaginal odor       OBJECTIVE:   Vitals:    25 1102   BP: 118/70   Weight: 60.8 kg (134 lb)   Height: 170.2 cm (67\")        Physical Exam  Constitutional:       General: She is not in acute distress.     Appearance: Normal appearance. She is not ill-appearing, toxic-appearing or diaphoretic.   Genitourinary:      Bladder and urethral meatus normal.      No lesions in the vagina.      Right Labia: No rash, tenderness, lesions, skin changes or Bartholin's cyst.     Left Labia: No tenderness, lesions, skin changes, Bartholin's cyst or rash.     No labial fusion noted.      No inguinal adenopathy present in the right or left side.     Vaginal discharge (thin, white, frothy, small amount) present.      No vaginal erythema, tenderness, bleeding, ulceration or granulation tissue.    "   No vaginal prolapse present.     No vaginal atrophy present.       Right Adnexa: not tender, not full, not palpable, no mass present and not absent.     Left Adnexa: not tender, not full, not palpable, no mass present and not absent.     No cervical motion tenderness, discharge, friability, lesion, polyp, nabothian cyst or eversion.      Uterus is not enlarged, fixed, tender, irregular or prolapsed.      No uterine mass detected.  Cardiovascular:      Rate and Rhythm: Normal rate.   Pulmonary:      Effort: Pulmonary effort is normal.   Abdominal:      General: There is no distension.      Palpations: Abdomen is soft. There is no mass.      Tenderness: There is no abdominal tenderness. There is no guarding.      Hernia: No hernia is present. There is no hernia in the left inguinal area or right inguinal area.   Musculoskeletal:         General: Normal range of motion.      Cervical back: Normal range of motion.   Lymphadenopathy:      Lower Body: No right inguinal adenopathy. No left inguinal adenopathy.   Neurological:      General: No focal deficit present.      Mental Status: She is alert and oriented to person, place, and time.   Skin:     General: Skin is warm and dry.   Psychiatric:         Mood and Affect: Mood normal.         Behavior: Behavior normal.         Thought Content: Thought content normal.         Judgment: Judgment normal.   Vitals and nursing note reviewed.       Brief Urine Lab Results  (Last result in the past 365 days)        Color   Clarity   Blood   Leuk Est   Nitrite   Protein   CREAT   Urine HCG        04/02/25 1130     Negative   Negative   Negative   Negative                  Assessment/Plan    Diagnoses and all orders for this visit:    1. Urinary frequency (Primary)  -     NuSwab VG+ - Swab, Vagina    2. Screening for STD (sexually transmitted disease)  -     NuSwab VG+ - Swab, Vagina    3. Vaginal discharge  -     metroNIDAZOLE (Flagyl) 500 MG tablet; Take 1 tablet by mouth 2 (Two)  Times a Day for 7 days.  Dispense: 14 tablet; Refill: 0    4. Vaginal odor  -     metroNIDAZOLE (Flagyl) 500 MG tablet; Take 1 tablet by mouth 2 (Two) Times a Day for 7 days.  Dispense: 14 tablet; Refill: 0      Small amount of thin, white, frothy vaginal discharge noted. No odor noted  Vaginal cultures obtained  Possible BV vs STI  She would like to start treatment for BV while awaiting results. Advised no alcohol with flagyl use  Encouraged condoms with IC, cotton only underwear, mild or no soaps, avoidance of douching or marcus steaming  Urine dip negative for infection  Enc increased PO hydration  Call with any worsening symptoms   Overdue for routine AE, encouraged to schedule    Return in about 3 months (around 7/2/2025), or if symptoms worsen or fail to improve, for Annual physical, With Dr Rojas.    I spent 21 minutes caring for Harleen on this date of service. This time includes time spent by me in the following activities: preparing for the visit, reviewing tests, performing a medically appropriate examination and/or evaluation, counseling and educating the patient/family/caregiver, referring and communicating with other health care professionals, documenting information in the medical record, independently interpreting results and communicating that information with the patient/family/caregiver, care coordination, ordering medications, ordering test(s), obtaining a separately obtained history, and reviewing a separately obtained history    Dayna Vora, DELPHINE  4/2/2025  11:23 EDT

## 2025-04-04 LAB
A VAGINAE DNA VAG QL NAA+PROBE: ABNORMAL SCORE
BVAB2 DNA VAG QL NAA+PROBE: ABNORMAL SCORE
C ALBICANS DNA VAG QL NAA+PROBE: NEGATIVE
C GLABRATA DNA VAG QL NAA+PROBE: NEGATIVE
C TRACH DNA SPEC QL NAA+PROBE: NEGATIVE
MEGA1 DNA VAG QL NAA+PROBE: ABNORMAL SCORE
N GONORRHOEA DNA VAG QL NAA+PROBE: NEGATIVE
T VAGINALIS DNA VAG QL NAA+PROBE: NEGATIVE

## 2025-04-11 ENCOUNTER — TELEPHONE (OUTPATIENT)
Dept: OBSTETRICS AND GYNECOLOGY | Facility: CLINIC | Age: 25
End: 2025-04-11

## 2025-04-11 NOTE — TELEPHONE ENCOUNTER
Hub staff attempted to follow warm transfer process and was unsuccessful     Caller: Harleen Purdy    Relationship to patient: Self    Best call back number: [phone]     Patient is needing: PATIENT CALLED BACK IN REGARDS TO A VOICEMAIL SHE RECEIVED ABOUT R/S HER NEW OB APPOINTMENT WITH DR. YE ON 04/22/2025.  HUB FIRST AVAILABLE WAS FOR 04/29/2025. PATIENT REQUESTED A MESSAGE BE SENT TO THE OFFICE TO SEE IF THERE WOULD BE AN EARLIER APPOINTMENT THAN THE 04/29/2025.

## 2025-04-29 ENCOUNTER — INITIAL PRENATAL (OUTPATIENT)
Dept: OBSTETRICS AND GYNECOLOGY | Facility: CLINIC | Age: 25
End: 2025-04-29
Payer: COMMERCIAL

## 2025-04-29 VITALS — DIASTOLIC BLOOD PRESSURE: 59 MMHG | WEIGHT: 131 LBS | BODY MASS INDEX: 20.52 KG/M2 | SYSTOLIC BLOOD PRESSURE: 101 MMHG

## 2025-04-29 DIAGNOSIS — Z11.3 SCREEN FOR STD (SEXUALLY TRANSMITTED DISEASE): ICD-10-CM

## 2025-04-29 DIAGNOSIS — O41.8X10 SUBCHORIONIC HEMATOMA IN FIRST TRIMESTER, SINGLE OR UNSPECIFIED FETUS: ICD-10-CM

## 2025-04-29 DIAGNOSIS — Z34.81 ENCOUNTER FOR SUPERVISION OF OTHER NORMAL PREGNANCY IN FIRST TRIMESTER: Primary | ICD-10-CM

## 2025-04-29 DIAGNOSIS — O46.8X1 SUBCHORIONIC HEMATOMA IN FIRST TRIMESTER, SINGLE OR UNSPECIFIED FETUS: ICD-10-CM

## 2025-04-29 LAB
GLUCOSE UR STRIP-MCNC: NEGATIVE MG/DL
PROT UR STRIP-MCNC: NEGATIVE MG/DL

## 2025-04-29 RX ORDER — SWAB
SWAB, NON-MEDICATED MISCELLANEOUS
COMMUNITY
End: 2025-04-29

## 2025-04-29 RX ORDER — SWAB
1 SWAB, NON-MEDICATED MISCELLANEOUS DAILY
Qty: 90 EACH | Refills: 3 | Status: SHIPPED | OUTPATIENT
Start: 2025-04-29

## 2025-04-29 NOTE — PROGRESS NOTES
Initial ob visit     CC- Here for care of pregnancy     Harleen Purdy is being seen today for her first obstetrical visit.  She is a 24 y.o.  7w6d gestation.     # 1 - Date: 09/10/21, Sex: Male, Weight: 3426 g (7 lb 8.9 oz), GA: 39w3d, Type: Vaginal, Spontaneous, Apgar1: 8, Apgar5: 9, Living: Living, Birth Comments: scale 1    # 2 - Date: 24, Sex: Male, Weight: 3646 g (8 lb 0.6 oz), GA: 39w0d, Type: Vaginal, Spontaneous, Apgar1: 8, Apgar5: 9, Living: Living, Birth Comments: scale 4     # 3 - Date: None, Sex: None, Weight: None, GA: None, Type: None, Apgar1: None, Apgar5: None, Living: None, Birth Comments: None      Current obstetric complaints : cramping, nausea, fatigue  Duration/severity of complaints:   Initial positive test date : 2025     Location : @ home    Prior obstetric issues, potential pregnancy concerns: none  Family history of genetic issues (includes FOB): none  Prior infections concerning in pregnancy (Rash, fever in last 2 weeks): none  Varicella Hx -negative history  Prior testing for Cystic Fibrosis Carrier or Sickle Cell Trait- negative CF  Prepregnancy BMI - Body mass index is 20.52 kg/m².  Hx of HSV for patient or partner : No     History reviewed. No pertinent past medical history.    Past Surgical History:   Procedure Laterality Date    LEG SURGERY           Current Outpatient Medications:     Prenatal 28-0.8 MG tablet, Take 1 tablet by mouth Daily. Please use formulary or generic, with DHA ideal, Disp: 90 each, Rfl: 3    No Known Allergies    Social History     Socioeconomic History    Marital status: Single   Tobacco Use    Smoking status: Former     Types: Cigarettes    Smokeless tobacco: Never   Vaping Use    Vaping status: Some Days   Substance and Sexual Activity    Alcohol use: Not Currently    Drug use: Never    Sexual activity: Yes     Partners: Male       Family History   Problem Relation Age of Onset    No Known Problems Father     No Known Problems Mother      Breast cancer Neg Hx     Ovarian cancer Neg Hx     Uterine cancer Neg Hx     Colon cancer Neg Hx     Deep vein thrombosis Neg Hx     Pulmonary embolism Neg Hx          Review of Systems   Constitutional:  Negative for fever.   Eyes:  Negative for visual disturbance.   Gastrointestinal:  Positive for nausea. Negative for abdominal pain and vomiting.   Genitourinary:  Positive for breast pain. Negative for pelvic pain, vaginal bleeding and vaginal discharge.   Neurological:  Negative for headache.   All other systems reviewed and are negative.          Objective    /59   Wt 59.4 kg (131 lb)   LMP 03/02/2025   BMI 20.52 kg/m²     Prenatal Assessment  Fetal Heart Rate: +  Fundal Height (cm): 8 cm  Movement: Absent  Dilation/Effacement/Station  Dilation: Closed  Effacement (%): 0  Station: -2  Edema  LLE Edema: None  RLE Edema: None  Facial Edema: None    Physical Exam  Constitutional:       General: She is not in acute distress.     Appearance: Normal appearance. She is normal weight.   Genitourinary:      Right Labia: No lesions or Bartholin's cyst.     Left Labia: No lesions or Bartholin's cyst.     No inguinal adenopathy present in the right or left side.     No vaginal discharge or bleeding.        Right Adnexa: not tender, not full and no mass present.     Left Adnexa: not tender, not full and no mass present.     No cervical motion tenderness or lesion.      Uterus is enlarged (9 weeks).      Uterus is not tender.      No uterine mass detected.     Uterus is anteverted.   Breasts:     Right: No inverted nipple, mass or nipple discharge.      Left: No inverted nipple, mass or nipple discharge.   HENT:      Head: Normocephalic and atraumatic.      Mouth/Throat:      Mouth: Mucous membranes are moist.   Eyes:      Conjunctiva/sclera: Conjunctivae normal.   Cardiovascular:      Rate and Rhythm: Normal rate and regular rhythm.      Pulses: Normal pulses.      Heart sounds: No murmur heard.  Pulmonary:       Effort: Pulmonary effort is normal. No respiratory distress.      Breath sounds: Normal breath sounds.   Abdominal:      General: There is no distension.      Palpations: Abdomen is soft.      Tenderness: There is no abdominal tenderness.   Musculoskeletal:         General: No tenderness.      Cervical back: Normal range of motion and neck supple. No tenderness.      Right lower leg: No edema.      Left lower leg: No edema.   Lymphadenopathy:      Cervical: No cervical adenopathy.      Lower Body: No right inguinal adenopathy. No left inguinal adenopathy.   Neurological:      General: No focal deficit present.      Mental Status: She is alert.   Skin:     General: Skin is warm and dry.      Capillary Refill: Capillary refill takes less than 2 seconds.   Psychiatric:         Thought Content: Thought content normal.   Vitals reviewed. Exam conducted with a chaperone present.                Assessment & Plan     Diagnoses and all orders for this visit:    1. Encounter for supervision of other normal pregnancy in first trimester (Primary)  -     POC Urinalysis Dipstick  -     Urine Culture - , Urine, Clean Catch  -     OB Panel With HIV and Treponema Palldium Ab    2. Subchorionic hematoma in first trimester, single or unspecified fetus    3. Screen for STD (sexually transmitted disease)  -     Chlamydia trachomatis, Neisseria gonorrhoeae, Trichomonas vaginalis, PCR - Swab, Vagina    Other orders  -     Prenatal 28-0.8 MG tablet; Take 1 tablet by mouth Daily. Please use formulary or generic, with DHA ideal  Dispense: 90 each; Refill: 3        1) Pregnancy at 7w6d  Routine ob care reviewed.   Screening done   2) Subchorionic hematoma seen on dating scan   Asymptomatic   Warnings signs reviewed.   Otherwise not follow up or intervention necessary          Activity recommendation : 150 minutes/week of moderate intensity aerobic activity unless we limit for bleeding, hypertension or other pregnancy complication   Prenatal  vitamins reviewed   Problem list reviewed and updated.  Reviewed routine prenatal care with the office to include but not limited to   General pregnancy recommendations reviewed including but not limited to not changing cat litter, food restrictions, avoidance of alcohol, tobacco and drugs and saunas/hot tubs.   All questions answered.     Ray Rojas MD   4/29/2025  13:11 EDT

## 2025-05-02 ENCOUNTER — RESULTS FOLLOW-UP (OUTPATIENT)
Dept: OBSTETRICS AND GYNECOLOGY | Facility: CLINIC | Age: 25
End: 2025-05-02
Payer: COMMERCIAL

## 2025-05-02 LAB
ABO GROUP BLD: NORMAL
BASOPHILS # BLD AUTO: 0 X10E3/UL (ref 0–0.2)
BASOPHILS NFR BLD AUTO: 1 %
BLD GP AB SCN SERPL QL: NEGATIVE
C TRACH RRNA SPEC QL NAA+PROBE: NEGATIVE
EOSINOPHIL # BLD AUTO: 0.1 X10E3/UL (ref 0–0.4)
EOSINOPHIL NFR BLD AUTO: 2 %
ERYTHROCYTE [DISTWIDTH] IN BLOOD BY AUTOMATED COUNT: 12.3 % (ref 11.7–15.4)
HBV SURFACE AG SERPL QL IA: NEGATIVE
HCT VFR BLD AUTO: 35.4 % (ref 34–46.6)
HCV AB SERPL QL IA: NON REACTIVE
HCV AB SERPL QL IA: NORMAL
HGB BLD-MCNC: 11.9 G/DL (ref 11.1–15.9)
HIV 1+2 AB+HIV1 P24 AG SERPL QL IA: NON REACTIVE
IMM GRANULOCYTES # BLD AUTO: 0 X10E3/UL (ref 0–0.1)
IMM GRANULOCYTES NFR BLD AUTO: 0 %
LYMPHOCYTES # BLD AUTO: 1.3 X10E3/UL (ref 0.7–3.1)
LYMPHOCYTES NFR BLD AUTO: 20 %
MCH RBC QN AUTO: 31.2 PG (ref 26.6–33)
MCHC RBC AUTO-ENTMCNC: 33.6 G/DL (ref 31.5–35.7)
MCV RBC AUTO: 93 FL (ref 79–97)
MONOCYTES # BLD AUTO: 0.5 X10E3/UL (ref 0.1–0.9)
MONOCYTES NFR BLD AUTO: 8 %
N GONORRHOEA RRNA SPEC QL NAA+PROBE: NEGATIVE
NEUTROPHILS # BLD AUTO: 4.3 X10E3/UL (ref 1.4–7)
NEUTROPHILS NFR BLD AUTO: 69 %
PLATELET # BLD AUTO: 233 X10E3/UL (ref 150–450)
RBC # BLD AUTO: 3.81 X10E6/UL (ref 3.77–5.28)
RH BLD: POSITIVE
RUBV IGG SERPL IA-ACNC: 3.15 INDEX
T VAGINALIS RRNA SPEC QL NAA+PROBE: NEGATIVE
TREPONEMA PALLIDUM IGG+IGM AB [PRESENCE] IN SERUM OR PLASMA BY IMMUNOASSAY: NON REACTIVE
WBC # BLD AUTO: 6.2 X10E3/UL (ref 3.4–10.8)

## 2025-05-02 RX ORDER — NITROFURANTOIN 25; 75 MG/1; MG/1
100 CAPSULE ORAL 2 TIMES DAILY
Qty: 14 CAPSULE | Refills: 0 | Status: SHIPPED | OUTPATIENT
Start: 2025-05-02 | End: 2025-05-09

## 2025-05-04 LAB
BACTERIA UR CULT: ABNORMAL
BACTERIA UR CULT: ABNORMAL
OTHER ANTIBIOTIC SUSC ISLT: ABNORMAL

## 2025-05-06 LAB
CYTOLOGIST CVX/VAG CYTO: ABNORMAL
CYTOLOGY CVX/VAG DOC CYTO: ABNORMAL
CYTOLOGY CVX/VAG DOC THIN PREP: ABNORMAL
DX ICD CODE: ABNORMAL
DX ICD CODE: ABNORMAL
HPV GENOTYPE REFLEX: ABNORMAL
HPV I/H RISK 4 DNA CVX QL PROBE+SIG AMP: POSITIVE
OTHER STN SPEC: ABNORMAL
PATHOLOGIST CVX/VAG CYTO: ABNORMAL
RECOM F/U CVX/VAG CYTO: ABNORMAL
SERVICE CMNT-IMP: ABNORMAL
STAT OF ADQ CVX/VAG CYTO-IMP: ABNORMAL

## 2025-05-27 ENCOUNTER — ROUTINE PRENATAL (OUTPATIENT)
Dept: OBSTETRICS AND GYNECOLOGY | Facility: CLINIC | Age: 25
End: 2025-05-27
Payer: COMMERCIAL

## 2025-05-27 VITALS — BODY MASS INDEX: 20.99 KG/M2 | SYSTOLIC BLOOD PRESSURE: 97 MMHG | WEIGHT: 134 LBS | DIASTOLIC BLOOD PRESSURE: 61 MMHG

## 2025-05-27 DIAGNOSIS — Z34.81 ENCOUNTER FOR SUPERVISION OF OTHER NORMAL PREGNANCY IN FIRST TRIMESTER: Primary | ICD-10-CM

## 2025-05-27 DIAGNOSIS — R87.613 HGSIL (HIGH GRADE SQUAMOUS INTRAEPITHELIAL LESION) ON PAP SMEAR OF CERVIX: ICD-10-CM

## 2025-05-27 DIAGNOSIS — O21.9 NAUSEA AND VOMITING IN PREGNANCY: ICD-10-CM

## 2025-05-27 DIAGNOSIS — Z13.79 ENCOUNTER FOR GENETIC SCREENING FOR DOWN SYNDROME: ICD-10-CM

## 2025-05-27 LAB
GLUCOSE UR STRIP-MCNC: NEGATIVE MG/DL
PROT UR STRIP-MCNC: NEGATIVE MG/DL

## 2025-05-27 RX ORDER — ONDANSETRON 4 MG/1
4 TABLET, FILM COATED ORAL EVERY 8 HOURS PRN
Qty: 30 TABLET | Refills: 3 | Status: SHIPPED | OUTPATIENT
Start: 2025-05-27

## 2025-05-27 RX ORDER — PROMETHAZINE HYDROCHLORIDE 25 MG/1
25 TABLET ORAL EVERY 6 HOURS PRN
Qty: 30 TABLET | Refills: 2 | Status: SHIPPED | OUTPATIENT
Start: 2025-05-27

## 2025-05-27 NOTE — PROGRESS NOTES
OB follow up     Harleen Purdy is a 24 y.o.  12w2d being seen today for her obstetrical visit.  Patient reports no complaints. Fetal movement:  absent .    Her prenatal care is complicated by (and status): HGSIL on pap     Review of Systems  Cramping/contractions : none   Vaginal bleeding: none   Fetal movement too early     BP 97/61   Wt 60.8 kg (134 lb)   LMP 2025   BMI 20.99 kg/m²     Prenatal Assessment  Fetal Heart Rate: 143  Fundal Height (cm): 12 cm  Movement: Absent  Edema  LLE Edema: None  RLE Edema: None  Facial Edema: None        Assessment    Diagnoses and all orders for this visit:    1. Encounter for supervision of other normal pregnancy in first trimester (Primary)  -     POC Urinalysis Dipstick    2. Encounter for genetic screening for Down Syndrome  -     [id number] PLUS Core+SCA - Blood,    3. Nausea and vomiting in pregnancy    4. HGSIL (high grade squamous intraepithelial lesion) on Pap smear of cervix    Other orders  -     promethazine (PHENERGAN) 25 MG tablet; Take 1 tablet by mouth Every 6 (Six) Hours As Needed for Nausea or Vomiting.  Dispense: 30 tablet; Refill: 2  -     ondansetron (ZOFRAN) 4 MG tablet; Take 1 tablet by mouth Every 8 (Eight) Hours As Needed for Nausea or Vomiting.  Dispense: 30 tablet; Refill: 3        1) pregnancy at 12w2d   Prenatal panel normal   STD screen negative   Urine culture negative.   Discussed and ordered NIPT   2) HGSIL   Pap with HGSIL ( was NIL)   Colpo next visit and likely pap/colpo at 6 weeks postpartum   3) nausea in pregnancy   Trial of zofran/phenergan       Plan    Reviewed this stage of pregnancy  Problem list updated   Follow up in 4 weeks with colpo     Ray Rojas MD   2025  10:26 EDT

## 2025-05-31 LAB
CFDNA.FET/CFDNA.TOTAL SFR FETUS: NORMAL %
CITATION REF LAB TEST: NORMAL
FET 13+18+21+X+Y ANEUP PLAS.CFDNA: NEGATIVE
FET CHR 21 TS PLAS.CFDNA QL: NEGATIVE
FET MS X RISK WBC.DNA+CFDNA QL: NOT DETECTED
FET SEX PLAS.CFDNA DOSAGE CFDNA: NORMAL
FET TS 13 RISK PLAS.CFDNA QL: NEGATIVE
FET TS 18 RISK WBC.DNA+CFDNA QL: NEGATIVE
FET X + Y ANEUP RISK PLAS.CFDNA SEQ-IMP: NOT DETECTED
GA EST FROM CONCEPTION DATE: NORMAL D
GESTATIONAL AGE > 9:: YES
LAB DIRECTOR NAME PROVIDER: NORMAL
LAB DIRECTOR NAME PROVIDER: NORMAL
LABORATORY COMMENT REPORT: NORMAL
LIMITATIONS OF THE TEST: NORMAL
NEGATIVE PREDICTIVE VALUE: NORMAL
PERFORMANCE CHARACTERISTICS: NORMAL
POSITIVE PREDICTIVE VALUE: NORMAL
REF LAB TEST METHOD: NORMAL
SERVICE CMNT-IMP: NORMAL
TEST PERFORMANCE INFO SPEC: NORMAL

## 2025-06-01 ENCOUNTER — RESULTS FOLLOW-UP (OUTPATIENT)
Dept: OBSTETRICS AND GYNECOLOGY | Facility: CLINIC | Age: 25
End: 2025-06-01
Payer: COMMERCIAL

## 2025-06-24 ENCOUNTER — TELEPHONE (OUTPATIENT)
Dept: OBSTETRICS AND GYNECOLOGY | Facility: CLINIC | Age: 25
End: 2025-06-24

## 2025-06-24 ENCOUNTER — ROUTINE PRENATAL (OUTPATIENT)
Dept: OBSTETRICS AND GYNECOLOGY | Facility: CLINIC | Age: 25
End: 2025-06-24
Payer: COMMERCIAL

## 2025-06-24 VITALS — SYSTOLIC BLOOD PRESSURE: 113 MMHG | BODY MASS INDEX: 20.36 KG/M2 | WEIGHT: 130 LBS | DIASTOLIC BLOOD PRESSURE: 76 MMHG

## 2025-06-24 DIAGNOSIS — O02.1 MISSED ABORTION: Primary | ICD-10-CM

## 2025-06-24 PROCEDURE — 99214 OFFICE O/P EST MOD 30 MIN: CPT | Performed by: OBSTETRICS & GYNECOLOGY

## 2025-06-24 RX ORDER — DOXYCYCLINE 100 MG/1
200 CAPSULE ORAL ONCE
OUTPATIENT
Start: 2025-06-24 | End: 2025-06-24

## 2025-06-24 RX ORDER — SODIUM CHLORIDE 9 MG/ML
40 INJECTION, SOLUTION INTRAVENOUS AS NEEDED
OUTPATIENT
Start: 2025-06-24

## 2025-06-24 RX ORDER — SODIUM CHLORIDE 0.9 % (FLUSH) 0.9 %
3 SYRINGE (ML) INJECTION EVERY 12 HOURS SCHEDULED
OUTPATIENT
Start: 2025-06-24

## 2025-06-24 RX ORDER — SODIUM CHLORIDE 0.9 % (FLUSH) 0.9 %
10 SYRINGE (ML) INJECTION AS NEEDED
OUTPATIENT
Start: 2025-06-24

## 2025-06-24 NOTE — PROGRESS NOTES
Subjective    is a 24 y.o. female.     Chief Complaint   Patient presents with    Routine Prenatal Visit        HPI    24 y.o.  @ 16w2d  Went yesterday to [facility] for bleeding   Found to have IUFD c/w 13 weeks on ultrasound   Bleeding better     Review of Systems   Constitutional:  Negative for fever.   Gastrointestinal:  Positive for abdominal pain.   Genitourinary:  Positive for pelvic pain and vaginal bleeding.        Objective   /76   Wt 59 kg (130 lb)   LMP 2025   BMI 20.36 kg/m²   Physical Exam  Constitutional:       General: She is not in acute distress.     Appearance: Normal appearance. She is well-developed and normal weight.   Neck:      Thyroid: No thyromegaly.   Pulmonary:      Effort: No respiratory distress.   Abdominal:      General: Abdomen is flat. There is no distension.      Palpations: Abdomen is soft.      Tenderness: There is no abdominal tenderness.   Musculoskeletal:      Right lower leg: No edema.      Left lower leg: No edema.   Neurological:      Mental Status: She is alert and oriented to person, place, and time.   Skin:     General: Skin is warm and dry.   Psychiatric:         Behavior: Behavior normal.         Thought Content: Thought content normal.         Judgment: Judgment normal.   Vitals reviewed.        Bedside scan shows   No cardiac activity, little amniotic fluid.       Assessment/Plan   Diagnoses and all orders for this visit:    1. Missed  (Primary)  -     POC Urinalysis Dipstick  -     Code Status and Medical Interventions: CPR (Attempt to Resuscitate); Full Support; Standing  -     Case Request; Standing  -     Follow Anesthesia Guidelines / Protocol; Future  -     Follow Anesthesia Guidelines / Protocol; Standing  -     Chlorhexidine Skin Prep; Future  -     Verify NPO Status; Standing  -     Verify The Time Patient Completed ERAS Hydration Drink; Standing  -     Verify / Perform Chlorhexidine Skin Prep; Standing  -     CBC and  Differential; Future  -     Insert Peripheral IV; Standing  -     Saline Lock & Maintain IV Access; Standing  -     sodium chloride 0.9 % flush 3 mL  -     sodium chloride 0.9 % flush 10 mL  -     sodium chloride 0.9 % infusion 40 mL  -     doxycycline (MONODOX) capsule 200 mg  -     Place Sequential Compression Device; Standing  -     Maintain Sequential Compression Device; Standing  -     Case Request    Reviewed findings of ultrasound today and prior ultrasound yesterday     Has missed miscarriage   Late first trimester by size determined yesterday   Findings confirmed on bedside ultrasound today   Rh+    Discussed etiology and natural history, follow up     Discussed options for treatment.   - Observation and expectation for spontaneous miscarriage, uncertain timing   - inpatient admission for misoprostol to induce delivery   - Outpatient Suction D&C. (I called and discussed with Tessa Maira to confirm that given 13 week size, we were okay to proceed with as D&C and did not have to do inpatient medication per hospital protocol, which she confirmed as acceptable).     Patient wishes to have D&C      The risks, benefits, and alternatives were discussed including surgical risks such as bleeding with need for transfusion, infection, and damage to adjacent structures including but not limited to bowel, bladder, ureter and vascular structures. Specifically reviewed perforation and excessive bleeding. Goals of the procedure defined and expectations reviewed. Finally we discussed expectations for the day of surgery and typical recovery. Voiced understanding and desire to proceed. All questions answered.      Ray Rojas MD   6/24/2025  10:27 EDT

## 2025-06-25 ENCOUNTER — HOSPITAL ENCOUNTER (OUTPATIENT)
Facility: HOSPITAL | Age: 25
Discharge: HOME | End: 2025-06-25
Attending: EMERGENCY MEDICINE
Payer: COMMERCIAL

## 2025-06-25 ENCOUNTER — TELEPHONE (OUTPATIENT)
Dept: OBSTETRICS AND GYNECOLOGY | Facility: CLINIC | Age: 25
End: 2025-06-25
Payer: COMMERCIAL

## 2025-06-25 ENCOUNTER — APPOINTMENT (OUTPATIENT)
Facility: HOSPITAL | Age: 25
End: 2025-06-25
Payer: COMMERCIAL

## 2025-06-25 ENCOUNTER — ANESTHESIA (OUTPATIENT)
Dept: PERIOP | Facility: HOSPITAL | Age: 25
End: 2025-06-25
Payer: COMMERCIAL

## 2025-06-25 ENCOUNTER — ANESTHESIA EVENT (OUTPATIENT)
Dept: PERIOP | Facility: HOSPITAL | Age: 25
End: 2025-06-25
Payer: COMMERCIAL

## 2025-06-25 ENCOUNTER — APPOINTMENT (OUTPATIENT)
Dept: GENERAL RADIOLOGY | Facility: HOSPITAL | Age: 25
End: 2025-06-25
Payer: COMMERCIAL

## 2025-06-25 VITALS
DIASTOLIC BLOOD PRESSURE: 63 MMHG | TEMPERATURE: 98.2 F | OXYGEN SATURATION: 97 % | RESPIRATION RATE: 16 BRPM | SYSTOLIC BLOOD PRESSURE: 105 MMHG | HEART RATE: 101 BPM

## 2025-06-25 DIAGNOSIS — O02.1 MISSED ABORTION: Primary | ICD-10-CM

## 2025-06-25 DIAGNOSIS — O03.9 MISCARRIAGE: ICD-10-CM

## 2025-06-25 DIAGNOSIS — O03.4 INCOMPLETE ABORTION: ICD-10-CM

## 2025-06-25 LAB
ABO GROUP BLD: NORMAL
ALBUMIN SERPL-MCNC: 3.6 G/DL (ref 3.5–5.2)
ALBUMIN/GLOB SERPL: 1.2 G/DL
ALP SERPL-CCNC: 91 U/L (ref 39–117)
ALT SERPL W P-5'-P-CCNC: 9 U/L (ref 1–33)
AMPHET+METHAMPHET UR QL: NEGATIVE
AMPHETAMINES UR QL: NEGATIVE
ANION GAP SERPL CALCULATED.3IONS-SCNC: 12.5 MMOL/L (ref 5–15)
APTT PPP: 27.7 SECONDS (ref 22.7–35.4)
AST SERPL-CCNC: 13 U/L (ref 1–32)
BACTERIA UR QL AUTO: ABNORMAL /HPF
BARBITURATES UR QL SCN: NEGATIVE
BASOPHILS # BLD AUTO: 0.05 10*3/MM3 (ref 0–0.2)
BASOPHILS NFR BLD AUTO: 0.4 % (ref 0–1.5)
BENZODIAZ UR QL SCN: NEGATIVE
BILIRUB SERPL-MCNC: 0.4 MG/DL (ref 0–1.2)
BILIRUB UR QL STRIP: NEGATIVE
BLD GP AB SCN SERPL QL: NEGATIVE
BUN SERPL-MCNC: 11 MG/DL (ref 6–20)
BUN/CREAT SERPL: 20 (ref 7–25)
BUPRENORPHINE SERPL-MCNC: NEGATIVE NG/ML
CALCIUM SPEC-SCNC: 9 MG/DL (ref 8.6–10.5)
CANNABINOIDS SERPL QL: NEGATIVE
CHLORIDE SERPL-SCNC: 106 MMOL/L (ref 98–107)
CLARITY UR: CLEAR
CO2 SERPL-SCNC: 20.5 MMOL/L (ref 22–29)
COCAINE UR QL: NEGATIVE
COLOR UR: YELLOW
CREAT SERPL-MCNC: 0.55 MG/DL (ref 0.57–1)
DEPRECATED RDW RBC AUTO: 43.1 FL (ref 37–54)
EGFRCR SERPLBLD CKD-EPI 2021: 132.3 ML/MIN/1.73
EOSINOPHIL # BLD AUTO: 0.12 10*3/MM3 (ref 0–0.4)
EOSINOPHIL NFR BLD AUTO: 0.9 % (ref 0.3–6.2)
ERYTHROCYTE [DISTWIDTH] IN BLOOD BY AUTOMATED COUNT: 12.7 % (ref 12.3–15.4)
FENTANYL UR-MCNC: NEGATIVE NG/ML
GEN 5 1HR TROPONIN T REFLEX: <6 NG/L
GLOBULIN UR ELPH-MCNC: 3 GM/DL
GLUCOSE SERPL-MCNC: 90 MG/DL (ref 65–99)
GLUCOSE UR STRIP-MCNC: NEGATIVE MG/DL
HCG INTACT+B SERPL-ACNC: 1097 MIU/ML
HCT VFR BLD AUTO: 25.7 % (ref 34–46.6)
HCT VFR BLD AUTO: 30.8 % (ref 34–46.6)
HGB BLD-MCNC: 10.4 G/DL (ref 12–15.9)
HGB BLD-MCNC: 8.8 G/DL (ref 12–15.9)
HGB UR QL STRIP.AUTO: ABNORMAL
HYALINE CASTS UR QL AUTO: ABNORMAL /LPF
IMM GRANULOCYTES # BLD AUTO: 0.07 10*3/MM3 (ref 0–0.05)
IMM GRANULOCYTES NFR BLD AUTO: 0.5 % (ref 0–0.5)
INR PPP: 1.18 (ref 0.9–1.1)
KETONES UR QL STRIP: ABNORMAL
LEUKOCYTE ESTERASE UR QL STRIP.AUTO: ABNORMAL
LYMPHOCYTES # BLD AUTO: 1.25 10*3/MM3 (ref 0.7–3.1)
LYMPHOCYTES NFR BLD AUTO: 9.4 % (ref 19.6–45.3)
MCH RBC QN AUTO: 31.5 PG (ref 26.6–33)
MCHC RBC AUTO-ENTMCNC: 33.8 G/DL (ref 31.5–35.7)
MCV RBC AUTO: 93.3 FL (ref 79–97)
METHADONE UR QL SCN: NEGATIVE
MONOCYTES # BLD AUTO: 0.67 10*3/MM3 (ref 0.1–0.9)
MONOCYTES NFR BLD AUTO: 5 % (ref 5–12)
NEUTROPHILS NFR BLD AUTO: 11.2 10*3/MM3 (ref 1.7–7)
NEUTROPHILS NFR BLD AUTO: 83.8 % (ref 42.7–76)
NITRITE UR QL STRIP: NEGATIVE
NRBC BLD AUTO-RTO: 0 /100 WBC (ref 0–0.2)
OPIATES UR QL: NEGATIVE
OXYCODONE UR QL SCN: NEGATIVE
PCP UR QL SCN: NEGATIVE
PH UR STRIP.AUTO: 5.5 [PH] (ref 5–8)
PLATELET # BLD AUTO: 274 10*3/MM3 (ref 140–450)
PMV BLD AUTO: 8.7 FL (ref 6–12)
POTASSIUM SERPL-SCNC: 4 MMOL/L (ref 3.5–5.2)
PROT SERPL-MCNC: 6.6 G/DL (ref 6–8.5)
PROT UR QL STRIP: ABNORMAL
PROTHROMBIN TIME: 15 SECONDS (ref 11.7–14.2)
RBC # BLD AUTO: 3.3 10*6/MM3 (ref 3.77–5.28)
RBC # UR STRIP: ABNORMAL /HPF
REF LAB TEST METHOD: ABNORMAL
RH BLD: POSITIVE
SODIUM SERPL-SCNC: 139 MMOL/L (ref 136–145)
SP GR UR STRIP: 1.03 (ref 1–1.03)
SQUAMOUS #/AREA URNS HPF: ABNORMAL /HPF
T&S EXPIRATION DATE: NORMAL
TRICYCLICS UR QL SCN: NEGATIVE
TROPONIN T NUMERIC DELTA: NORMAL
TROPONIN T SERPL HS-MCNC: <6 NG/L
UROBILINOGEN UR QL STRIP: ABNORMAL
WBC # UR STRIP: ABNORMAL /HPF
WBC NRBC COR # BLD AUTO: 13.36 10*3/MM3 (ref 3.4–10.8)

## 2025-06-25 PROCEDURE — 93005 ELECTROCARDIOGRAM TRACING: CPT

## 2025-06-25 PROCEDURE — 85610 PROTHROMBIN TIME: CPT

## 2025-06-25 PROCEDURE — S0260 H&P FOR SURGERY: HCPCS | Performed by: OBSTETRICS & GYNECOLOGY

## 2025-06-25 PROCEDURE — 25010000002 PROPOFOL 10 MG/ML EMULSION

## 2025-06-25 PROCEDURE — 25010000002 ONDANSETRON PER 1 MG

## 2025-06-25 PROCEDURE — G0378 HOSPITAL OBSERVATION PER HR: HCPCS

## 2025-06-25 PROCEDURE — 84484 ASSAY OF TROPONIN QUANT: CPT

## 2025-06-25 PROCEDURE — 99285 EMERGENCY DEPT VISIT HI MDM: CPT

## 2025-06-25 PROCEDURE — 85014 HEMATOCRIT: CPT

## 2025-06-25 PROCEDURE — 25810000003 SODIUM CHLORIDE 0.9 % SOLUTION: Performed by: EMERGENCY MEDICINE

## 2025-06-25 PROCEDURE — 25010000002 DEXAMETHASONE SODIUM PHOSPHATE 20 MG/5ML SOLUTION

## 2025-06-25 PROCEDURE — 76801 OB US < 14 WKS SINGLE FETUS: CPT

## 2025-06-25 PROCEDURE — 85018 HEMOGLOBIN: CPT

## 2025-06-25 PROCEDURE — 96374 THER/PROPH/DIAG INJ IV PUSH: CPT

## 2025-06-25 PROCEDURE — 88305 TISSUE EXAM BY PATHOLOGIST: CPT | Performed by: OBSTETRICS & GYNECOLOGY

## 2025-06-25 PROCEDURE — 81001 URINALYSIS AUTO W/SCOPE: CPT

## 2025-06-25 PROCEDURE — 80053 COMPREHEN METABOLIC PANEL: CPT

## 2025-06-25 PROCEDURE — 93010 ELECTROCARDIOGRAM REPORT: CPT | Performed by: INTERNAL MEDICINE

## 2025-06-25 PROCEDURE — 85730 THROMBOPLASTIN TIME PARTIAL: CPT

## 2025-06-25 PROCEDURE — 85025 COMPLETE CBC W/AUTO DIFF WBC: CPT

## 2025-06-25 PROCEDURE — 59812 TREATMENT OF MISCARRIAGE: CPT | Performed by: OBSTETRICS & GYNECOLOGY

## 2025-06-25 PROCEDURE — 71045 X-RAY EXAM CHEST 1 VIEW: CPT

## 2025-06-25 PROCEDURE — 80307 DRUG TEST PRSMV CHEM ANLYZR: CPT | Performed by: OBSTETRICS & GYNECOLOGY

## 2025-06-25 PROCEDURE — 25010000002 METHYLERGONOVINE MALEATE PER 0.2 MG

## 2025-06-25 PROCEDURE — 25010000002 HYDROMORPHONE 1 MG/ML SOLUTION

## 2025-06-25 PROCEDURE — 86850 RBC ANTIBODY SCREEN: CPT

## 2025-06-25 PROCEDURE — 25010000002 FAMOTIDINE 10 MG/ML SOLUTION: Performed by: ANESTHESIOLOGY

## 2025-06-25 PROCEDURE — 25010000002 DOXYCYCLINE 100 MG RECONSTITUTED SOLUTION 1 EACH VIAL: Performed by: OBSTETRICS & GYNECOLOGY

## 2025-06-25 PROCEDURE — 25810000003 LACTATED RINGERS PER 1000 ML: Performed by: ANESTHESIOLOGY

## 2025-06-25 PROCEDURE — 86923 COMPATIBILITY TEST ELECTRIC: CPT

## 2025-06-25 PROCEDURE — 25010000002 LIDOCAINE 2% SOLUTION

## 2025-06-25 PROCEDURE — 84702 CHORIONIC GONADOTROPIN TEST: CPT

## 2025-06-25 PROCEDURE — 86901 BLOOD TYPING SEROLOGIC RH(D): CPT

## 2025-06-25 PROCEDURE — 86900 BLOOD TYPING SEROLOGIC ABO: CPT

## 2025-06-25 PROCEDURE — 36415 COLL VENOUS BLD VENIPUNCTURE: CPT

## 2025-06-25 RX ORDER — IPRATROPIUM BROMIDE AND ALBUTEROL SULFATE 2.5; .5 MG/3ML; MG/3ML
3 SOLUTION RESPIRATORY (INHALATION) ONCE AS NEEDED
Status: DISCONTINUED | OUTPATIENT
Start: 2025-06-25 | End: 2025-06-26 | Stop reason: HOSPADM

## 2025-06-25 RX ORDER — OXYCODONE AND ACETAMINOPHEN 7.5; 325 MG/1; MG/1
1 TABLET ORAL EVERY 4 HOURS PRN
Status: DISCONTINUED | OUTPATIENT
Start: 2025-06-25 | End: 2025-06-26 | Stop reason: HOSPADM

## 2025-06-25 RX ORDER — ATROPINE SULFATE 0.4 MG/ML
0.4 INJECTION, SOLUTION INTRAMUSCULAR; INTRAVENOUS; SUBCUTANEOUS ONCE AS NEEDED
Status: DISCONTINUED | OUTPATIENT
Start: 2025-06-25 | End: 2025-06-26 | Stop reason: HOSPADM

## 2025-06-25 RX ORDER — ONDANSETRON 2 MG/ML
4 INJECTION INTRAMUSCULAR; INTRAVENOUS ONCE AS NEEDED
Status: COMPLETED | OUTPATIENT
Start: 2025-06-25 | End: 2025-06-25

## 2025-06-25 RX ORDER — ONDANSETRON 2 MG/ML
INJECTION INTRAMUSCULAR; INTRAVENOUS AS NEEDED
Status: DISCONTINUED | OUTPATIENT
Start: 2025-06-25 | End: 2025-06-25 | Stop reason: SURG

## 2025-06-25 RX ORDER — SODIUM CHLORIDE 0.9 % (FLUSH) 0.9 %
10 SYRINGE (ML) INJECTION AS NEEDED
Status: DISCONTINUED | OUTPATIENT
Start: 2025-06-25 | End: 2025-06-26 | Stop reason: HOSPADM

## 2025-06-25 RX ORDER — FENTANYL CITRATE 50 UG/ML
25 INJECTION, SOLUTION INTRAMUSCULAR; INTRAVENOUS
Refills: 0 | Status: DISCONTINUED | OUTPATIENT
Start: 2025-06-25 | End: 2025-06-26 | Stop reason: HOSPADM

## 2025-06-25 RX ORDER — SODIUM CHLORIDE 0.9 % (FLUSH) 0.9 %
3 SYRINGE (ML) INJECTION EVERY 12 HOURS SCHEDULED
Status: DISCONTINUED | OUTPATIENT
Start: 2025-06-25 | End: 2025-06-25 | Stop reason: HOSPADM

## 2025-06-25 RX ORDER — DEXAMETHASONE SODIUM PHOSPHATE 4 MG/ML
INJECTION, SOLUTION INTRA-ARTICULAR; INTRALESIONAL; INTRAMUSCULAR; INTRAVENOUS; SOFT TISSUE AS NEEDED
Status: DISCONTINUED | OUTPATIENT
Start: 2025-06-25 | End: 2025-06-25 | Stop reason: SURG

## 2025-06-25 RX ORDER — DEXMEDETOMIDINE HYDROCHLORIDE 100 UG/ML
INJECTION, SOLUTION INTRAVENOUS AS NEEDED
Status: DISCONTINUED | OUTPATIENT
Start: 2025-06-25 | End: 2025-06-25 | Stop reason: SURG

## 2025-06-25 RX ORDER — PHENYLEPHRINE HCL IN 0.9% NACL 1 MG/10 ML
SYRINGE (ML) INTRAVENOUS AS NEEDED
Status: DISCONTINUED | OUTPATIENT
Start: 2025-06-25 | End: 2025-06-25 | Stop reason: SURG

## 2025-06-25 RX ORDER — SODIUM CHLORIDE 0.9 % (FLUSH) 0.9 %
3-10 SYRINGE (ML) INJECTION AS NEEDED
Status: DISCONTINUED | OUTPATIENT
Start: 2025-06-25 | End: 2025-06-25 | Stop reason: HOSPADM

## 2025-06-25 RX ORDER — PROPOFOL 10 MG/ML
VIAL (ML) INTRAVENOUS AS NEEDED
Status: DISCONTINUED | OUTPATIENT
Start: 2025-06-25 | End: 2025-06-25 | Stop reason: SURG

## 2025-06-25 RX ORDER — ACETAMINOPHEN 500 MG
1000 TABLET ORAL ONCE
Status: COMPLETED | OUTPATIENT
Start: 2025-06-25 | End: 2025-06-25

## 2025-06-25 RX ORDER — MIDAZOLAM HYDROCHLORIDE 1 MG/ML
1 INJECTION, SOLUTION INTRAMUSCULAR; INTRAVENOUS
Status: DISCONTINUED | OUTPATIENT
Start: 2025-06-25 | End: 2025-06-25 | Stop reason: HOSPADM

## 2025-06-25 RX ORDER — SCOPOLAMINE 1 MG/3D
1 PATCH, EXTENDED RELEASE TRANSDERMAL ONCE
Status: DISCONTINUED | OUTPATIENT
Start: 2025-06-25 | End: 2025-06-26 | Stop reason: HOSPADM

## 2025-06-25 RX ORDER — SODIUM CHLORIDE, SODIUM LACTATE, POTASSIUM CHLORIDE, CALCIUM CHLORIDE 600; 310; 30; 20 MG/100ML; MG/100ML; MG/100ML; MG/100ML
9 INJECTION, SOLUTION INTRAVENOUS CONTINUOUS
Status: DISCONTINUED | OUTPATIENT
Start: 2025-06-25 | End: 2025-06-26 | Stop reason: HOSPADM

## 2025-06-25 RX ORDER — FAMOTIDINE 10 MG/ML
20 INJECTION, SOLUTION INTRAVENOUS ONCE
Status: COMPLETED | OUTPATIENT
Start: 2025-06-25 | End: 2025-06-25

## 2025-06-25 RX ORDER — FLUMAZENIL 0.1 MG/ML
0.2 INJECTION INTRAVENOUS AS NEEDED
Status: DISCONTINUED | OUTPATIENT
Start: 2025-06-25 | End: 2025-06-26 | Stop reason: HOSPADM

## 2025-06-25 RX ORDER — LABETALOL HYDROCHLORIDE 5 MG/ML
5 INJECTION, SOLUTION INTRAVENOUS
Status: DISCONTINUED | OUTPATIENT
Start: 2025-06-25 | End: 2025-06-26 | Stop reason: HOSPADM

## 2025-06-25 RX ORDER — HYDROCODONE BITARTRATE AND ACETAMINOPHEN 5; 325 MG/1; MG/1
1 TABLET ORAL ONCE AS NEEDED
Status: COMPLETED | OUTPATIENT
Start: 2025-06-25 | End: 2025-06-25

## 2025-06-25 RX ORDER — LIDOCAINE HYDROCHLORIDE 10 MG/ML
0.5 INJECTION, SOLUTION INFILTRATION; PERINEURAL ONCE AS NEEDED
Status: DISCONTINUED | OUTPATIENT
Start: 2025-06-25 | End: 2025-06-25 | Stop reason: HOSPADM

## 2025-06-25 RX ORDER — NALOXONE HCL 0.4 MG/ML
0.2 VIAL (ML) INJECTION AS NEEDED
Status: DISCONTINUED | OUTPATIENT
Start: 2025-06-25 | End: 2025-06-26 | Stop reason: HOSPADM

## 2025-06-25 RX ORDER — PROMETHAZINE HYDROCHLORIDE 25 MG/1
25 SUPPOSITORY RECTAL ONCE AS NEEDED
Status: DISCONTINUED | OUTPATIENT
Start: 2025-06-25 | End: 2025-06-26 | Stop reason: HOSPADM

## 2025-06-25 RX ORDER — METHYLERGONOVINE MALEATE 0.2 MG/ML
INJECTION INTRAVENOUS AS NEEDED
Status: DISCONTINUED | OUTPATIENT
Start: 2025-06-25 | End: 2025-06-25 | Stop reason: SURG

## 2025-06-25 RX ORDER — TRAMADOL HYDROCHLORIDE 50 MG/1
50 TABLET ORAL EVERY 6 HOURS PRN
Qty: 8 TABLET | Refills: 0 | Status: SHIPPED | OUTPATIENT
Start: 2025-06-25 | End: 2025-06-30

## 2025-06-25 RX ORDER — EPHEDRINE SULFATE 50 MG/ML
5 INJECTION, SOLUTION INTRAVENOUS ONCE AS NEEDED
Status: DISCONTINUED | OUTPATIENT
Start: 2025-06-25 | End: 2025-06-26 | Stop reason: HOSPADM

## 2025-06-25 RX ORDER — HYDROMORPHONE HYDROCHLORIDE 1 MG/ML
0.5 INJECTION, SOLUTION INTRAMUSCULAR; INTRAVENOUS; SUBCUTANEOUS
Status: DISCONTINUED | OUTPATIENT
Start: 2025-06-25 | End: 2025-06-26 | Stop reason: HOSPADM

## 2025-06-25 RX ORDER — TRAMADOL HYDROCHLORIDE 50 MG/1
50 TABLET ORAL EVERY 6 HOURS PRN
Status: DISCONTINUED | OUTPATIENT
Start: 2025-06-25 | End: 2025-06-26 | Stop reason: HOSPADM

## 2025-06-25 RX ORDER — FENTANYL CITRATE 50 UG/ML
50 INJECTION, SOLUTION INTRAMUSCULAR; INTRAVENOUS ONCE AS NEEDED
Status: DISCONTINUED | OUTPATIENT
Start: 2025-06-25 | End: 2025-06-25 | Stop reason: HOSPADM

## 2025-06-25 RX ORDER — DIPHENHYDRAMINE HYDROCHLORIDE 50 MG/ML
12.5 INJECTION, SOLUTION INTRAMUSCULAR; INTRAVENOUS
Status: DISCONTINUED | OUTPATIENT
Start: 2025-06-25 | End: 2025-06-26 | Stop reason: HOSPADM

## 2025-06-25 RX ORDER — LIDOCAINE HYDROCHLORIDE 20 MG/ML
INJECTION, SOLUTION INFILTRATION; PERINEURAL AS NEEDED
Status: DISCONTINUED | OUTPATIENT
Start: 2025-06-25 | End: 2025-06-25 | Stop reason: SURG

## 2025-06-25 RX ORDER — DROPERIDOL 2.5 MG/ML
0.62 INJECTION, SOLUTION INTRAMUSCULAR; INTRAVENOUS
Status: DISCONTINUED | OUTPATIENT
Start: 2025-06-25 | End: 2025-06-26 | Stop reason: HOSPADM

## 2025-06-25 RX ORDER — FENTANYL CITRATE 50 UG/ML
50 INJECTION, SOLUTION INTRAMUSCULAR; INTRAVENOUS
Status: DISCONTINUED | OUTPATIENT
Start: 2025-06-25 | End: 2025-06-26 | Stop reason: HOSPADM

## 2025-06-25 RX ORDER — PROMETHAZINE HYDROCHLORIDE 25 MG/1
25 TABLET ORAL ONCE AS NEEDED
Status: DISCONTINUED | OUTPATIENT
Start: 2025-06-25 | End: 2025-06-26 | Stop reason: HOSPADM

## 2025-06-25 RX ORDER — HYDRALAZINE HYDROCHLORIDE 20 MG/ML
5 INJECTION INTRAMUSCULAR; INTRAVENOUS
Status: DISCONTINUED | OUTPATIENT
Start: 2025-06-25 | End: 2025-06-26 | Stop reason: HOSPADM

## 2025-06-25 RX ORDER — ONDANSETRON 2 MG/ML
4 INJECTION INTRAMUSCULAR; INTRAVENOUS ONCE
Status: COMPLETED | OUTPATIENT
Start: 2025-06-25 | End: 2025-06-25

## 2025-06-25 RX ADMIN — DEXMEDETOMIDINE 4 MCG: 200 INJECTION, SOLUTION INTRAVENOUS at 18:54

## 2025-06-25 RX ADMIN — LIDOCAINE HYDROCHLORIDE 80 MG: 20 INJECTION, SOLUTION INFILTRATION; PERINEURAL at 18:46

## 2025-06-25 RX ADMIN — DEXAMETHASONE SODIUM PHOSPHATE 8 MG: 4 INJECTION, SOLUTION INTRAMUSCULAR; INTRAVENOUS at 18:55

## 2025-06-25 RX ADMIN — ONDANSETRON 4 MG: 2 INJECTION, SOLUTION INTRAMUSCULAR; INTRAVENOUS at 19:08

## 2025-06-25 RX ADMIN — SODIUM CHLORIDE, POTASSIUM CHLORIDE, SODIUM LACTATE AND CALCIUM CHLORIDE 9 ML/HR: 600; 310; 30; 20 INJECTION, SOLUTION INTRAVENOUS at 17:32

## 2025-06-25 RX ADMIN — Medication 50 MCG: at 18:54

## 2025-06-25 RX ADMIN — METHYLERGONOVINE MALEATE 200 MCG: 0.2 INJECTION, SOLUTION INTRAMUSCULAR; INTRAVENOUS at 19:06

## 2025-06-25 RX ADMIN — ACETAMINOPHEN 1000 MG: 500 TABLET, FILM COATED ORAL at 17:31

## 2025-06-25 RX ADMIN — ONDANSETRON 4 MG: 2 INJECTION, SOLUTION INTRAMUSCULAR; INTRAVENOUS at 20:20

## 2025-06-25 RX ADMIN — FAMOTIDINE 20 MG: 10 INJECTION INTRAVENOUS at 17:32

## 2025-06-25 RX ADMIN — ONDANSETRON 4 MG: 2 INJECTION, SOLUTION INTRAMUSCULAR; INTRAVENOUS at 13:47

## 2025-06-25 RX ADMIN — SODIUM CHLORIDE 1000 ML: 9 INJECTION, SOLUTION INTRAVENOUS at 13:14

## 2025-06-25 RX ADMIN — PROPOFOL 200 MG: 10 INJECTION, EMULSION INTRAVENOUS at 18:46

## 2025-06-25 RX ADMIN — DEXMEDETOMIDINE 4 MCG: 200 INJECTION, SOLUTION INTRAVENOUS at 18:51

## 2025-06-25 RX ADMIN — HYDROMORPHONE HYDROCHLORIDE 0.5 MG: 1 INJECTION, SOLUTION INTRAMUSCULAR; INTRAVENOUS; SUBCUTANEOUS at 18:51

## 2025-06-25 RX ADMIN — PROPOFOL 50 MG: 10 INJECTION, EMULSION INTRAVENOUS at 18:49

## 2025-06-25 RX ADMIN — DOXYCYCLINE 100 MG: 100 INJECTION, POWDER, LYOPHILIZED, FOR SOLUTION INTRAVENOUS at 18:37

## 2025-06-25 RX ADMIN — HYDROCODONE BITARTRATE AND ACETAMINOPHEN 1 TABLET: 5; 325 TABLET ORAL at 20:31

## 2025-06-25 RX ADMIN — Medication 100 MCG: at 19:03

## 2025-06-25 RX ADMIN — SCOPOLAMINE 1 PATCH: 1.5 PATCH, EXTENDED RELEASE TRANSDERMAL at 17:39

## 2025-06-25 RX ADMIN — Medication 150 MCG: at 19:09

## 2025-06-25 RX ADMIN — Medication 100 MCG: at 18:57

## 2025-06-25 NOTE — ED TRIAGE NOTES
Pt from home via ems, called for increased vaginal bleeding and syncope while on the toilet, did not hit head per family who witnessed, pt scheduled for D&C on 6/27 d/t miscarriage at 16 weeks

## 2025-06-25 NOTE — ANESTHESIA PREPROCEDURE EVALUATION
Anesthesia Evaluation     Patient summary reviewed and Nursing notes reviewed   no history of anesthetic complications:   NPO Solid Status: > 8 hours  NPO Liquid Status: > 8 hours           Airway   Mallampati: I  TM distance: >3 FB  Neck ROM: full  No difficulty expected  Dental - normal exam     Pulmonary    (+) a smoker Current,  (-) asthma, sleep apnea, rhonchi, decreased breath sounds, wheezes  Cardiovascular   Exercise tolerance: good (4-7 METS)    Rhythm: regular  Rate: normal    (-) hypertension, CAD, dysrhythmias, angina, NAVARRO, murmur      Neuro/Psych  (-) seizures, CVA  GI/Hepatic/Renal/Endo    (-) liver disease, no renal disease, diabetes, no thyroid disorder    Musculoskeletal     Abdominal     Abdomen: soft.   Substance History      OB/GYN    (+) Pregnant (spontaneous  at 13 weeks EGA)        Other                    Anesthesia Plan    ASA 2     general     intravenous induction     Anesthetic plan, risks, benefits, and alternatives have been provided, discussed and informed consent has been obtained with: patient.    CODE STATUS:

## 2025-06-25 NOTE — ED PROVIDER NOTES
MD ATTESTATION NOTE  I supervised care provided by the APC. We have discussed this patient's history, physical exam, and treatment plan. I have reviewed the APC's note and I agree with the APC's findings and plan of care.   SHARED VISIT: This visit was performed by BOTH a physician and an APC. The substantive portion of the medical decision making was performed by this attesting physician who made or approved the management plan and takes responsibility for patient management. All studies in the APC note (if performed) were independently interpreted by me.   I have personally had a face to face encounter with the patient.     PCP: Provider, No Known  Patient Care Team:  Provider, No Known as PCP - General     Harleen Purdy is a 24 y.o. female who presents to the ED c/o vaginal bleeding and syncope.  Patient was diagnosed with intrauterine fetal demise on Monday.  She is scheduled for a D&C on Friday with Dr. Rojas, her OB/GYN.  She was seen by him yesterday.  She began having abdominal cramping and vaginal bleeding earlier.  She noted blood clot and tissue passage.  Cramping has improved since when it started but is still present.  She also had a witnessed syncopal episode but was caught by her cousin.  She has no memory of this.    On exam:  General: NAD.  Head: NCAT.  ENT: nares patent, no scleral icterus  Neck: Supple, trachea midline.  Cardiac: regular rate and rhythm.  Lungs: normal effort, clear to auscultation bilaterally  Abdomen: Soft, nondistended, mild generalized tenderness, no rebound tenderness, no guarding or rigidity.   Extremities: Moves all extremities well, no peripheral edema  Neuro: alert, MAEW, follows commands  Psych: calm, cooperative  Skin: Warm, dry.    Medical Decision Making:  After the initial H&P, I discussed pertinent information from history and physical exam with patient/family.  Discussed differential diagnosis.  Discussed plan for ED evaluation/work-up/treatment.  All questions  answered.  Patient/family is agreeable with plan.    ED Course as of 25 171   133 Hemoglobin(!): 10.4  Was 11.9 one month prior [CV]   1332 WBC(!): 13.36 [CV]   1341 Consulted with Dr. Rojas,  with OB/GYN    We discussed the patient's history and physical exam, along with pertinent lab and imaging findings.  Requested that I consult with on-call OB/GYN   [CV]   1350 Consulted with Dr. Nelson,  with OB/GYN    We discussed the patient's history and physical exam, along with pertinent lab and imaging findings.  Dr. Nelson requests US to see if there is any retained fetus [CV]   1353 EKG interpreted by myself  Time: 134  Rate: 86  Rhythm: NSR  No ST elevation or depression  Normal intervals  Normal morphology [AB]   1517 Hemoglobin(!): 8.8 [CV]   1529 I spoke with Dr. Nelson, he is aware of pelvic exam findings and drop in hemoglobin, will come and assess patient at bedside [CV]   1601 Dr. Nelson assessed patient at bedside. Patient is unsure at this time if she wants to have a D&C. She will decide and let Dr. Nelson know. If she is having a D&C, she will go to OR. If not, patient will be admitted under Dr. Rojas for observation and serial H&H tonight [CV]   1711 Patient taken to the OR by OB/GYN [AB]      ED Course User Index  [AB] Perry Schneider MD  [CV] Stephen Dowling, PASamreenC       Diagnosis  Final diagnoses:   Incomplete         Perry Schneider MD  25 1711

## 2025-06-25 NOTE — TELEPHONE ENCOUNTER
Analisa,     She may or may not. May need to do ultrasound tomorrow to make sure everything came out.   If completed, she does NOT need D&C. If bleeding excessive and not slowing down, could need to see ED about more urgent procedure.     Orders Placed This Encounter   Procedures    US Non-ob Transvaginal     Reason for Exam::   See order     Release to patient:   Routine Release [[id number]]     Please arrange.     Thanks,   Dr. Rojas    Patient is calling to let you know that she is now bleeding and believes to be passing the miscarriage. She is scheduled for a D&C Friday, does she still need to have it done?    Please advise, thanks!

## 2025-06-25 NOTE — OP NOTE
OPERATIVE NOTE    Procedure: Suction dilation and curettage     Surgeon: John Nelson MD    Assistant: None    Preop diagnosis: Incomplete .    Postop diagnosis: Same    Indications: Patient is a 24 year old  with incomplete  at 13 weeks.  Ultrasound showed heterogeneous material in uterus with no clear sac or fetal pole, where one had previously been identified.  Cervix was dilated and patient had ongoing bleeding.    Findings: Tissue consistent with products of conception.    Anesthesia: GETA    EBL: 500 cc    Pathology: Products of conception    Complications: None    Procedure: Patient was taken to operating room. After adequate GETA anesthesia was placed on OR table in dorsal lithotomy position in candycane stirrups. Patient identified with two identifiers and timeout performed with all team members agreeing to the planned procedure.  The bimanual pelvic exam was performed.  Abdomen, perineum, vagina, and rectum was prepped and draped in a normal, sterile fashion.  Patient received doxycycline 100 mg in route to the OR.      A bivalve speculum was placed the vagina.  The cervix was visualized and anterior lip grasped with a single-tooth tenaculum.  The cervix was sufficiently dilated from status of incomplete .  A size 9 mm curved suction curette was then passed through the cervix to the uterine fundus.  The device was activated and pressure was achieved a level of about 60 millimeters of mercury.  Suction curettage was then performed with several passes being required to completely remove all the products of conception.  A sharp curette was then passed in all 4 quadrants until a gritty texture was felt, confirming no retained products.  A bimanual massage was performed and the uterus was firm and contracted in size.  Single additional pass was made with the suction curette with no further additional tissue obtained.  Following these measures there was no significant bleeding.  The single-tooth tenaculum was removed.  Excellent hemostasis was noted.  One dose of IM Methergine was given at end of surgery.    Counts for needles, sponges, laps and instruments were correct times two at the end of the procedure. I was present and scrubbed for the entire procedure. There were no major complications. The patient was transported to the recovery area in stable condition.

## 2025-06-25 NOTE — H&P
[facility]   HISTORY AND PHYSICAL    Patient Name: Harleen Purdy  : 2000  MRN: [id number]  Primary Care Physician:  Provider, No Known  Date of admission: 2025    Subjective   Subjective     Chief Complaint: Miscarriage    History of Present Illness - Patient is a 24 year old  with known first trimester intrauterine demise who presented to ED today with heavy bleeding and syncopal episode.  Patient has seen Dr Rojas and had confirmed viable gestation at 8 weeks.  She began having bleeding and cramping a few days ago and went to [facility] ED where sonogram revealed a 13 week fetal demise.  She was sent home and saw Dr Rojas yesterday and plans were made for D&C on Friday.  Patient presented to ED with above mentioned symptoms.    Review of Systems   Genitourinary:  Positive for vaginal bleeding.        Personal History     History reviewed. No pertinent past medical history.    Past Surgical History:   Procedure Laterality Date    LEG SURGERY         Family History: family history includes No Known Problems in her father and mother. Otherwise pertinent FHx was reviewed and not pertinent to current issue.    Social History:  reports that she has been smoking cigarettes. She has never used smokeless tobacco. She reports that she does not currently use alcohol. She reports that she does not use drugs.    Home Medications:  Prenatal    Allergies:  No Known Allergies    Objective    Objective     Vitals:   Temp:  [98.6 °F (37 °C)] 98.6 °F (37 °C)  Heart Rate:  [] 109  Resp:  [18] 18  BP: (90-99)/(55-70) 96/66    Physical Exam  Vitals reviewed. Exam conducted with a chaperone present.   Constitutional:       Appearance: Normal appearance.   Abdominal:      Tenderness: There is no abdominal tenderness. There is no guarding or rebound.   Genitourinary:     General: Normal vulva.      Exam position: Lithotomy position.      Labia:         Right: No rash or tenderness.         Left: No rash or  tenderness.       Vagina: Bleeding present.      Cervix: Cervical bleeding present.      Uterus: Enlarged.       Comments: Cervical os open  Neurological:      Mental Status: She is alert.   Psychiatric:         Mood and Affect: Mood normal.         Behavior: Behavior normal.         Thought Content: Thought content normal.         Judgment: Judgment normal.         Result Review         WBC 13.36 High  10*3/mm3   RBC 3.30 Low  10*6/mm3   Hemoglobin 10.4 Low  g/dL   Hematocrit 30.8 Low  %   MCV 93.3 fL   MCH 31.5 pg   MCHC 33.8 g/dL   RDW 12.7 %   RDW-SD 43.1 fl   MPV 8.7 fL   Platelets 274 10*3/mm3   Neutrophil % 83.8 High  %   Lymphocyte % 9.4 Low  %   Monocyte % 5.0 %   Eosinophil % 0.9 %   Basophil % 0.4 %   Immature Grans % 0.5 %   Neutrophils, Absolute 11.20 High  10*3/mm3   Lymphocytes, Absolute 1.25 10*3/mm3   Monocytes, Absolute 0.67 10*3/mm3   Eosinophils, Absolute 0.12 10*3/mm3   Basophils, Absolute 0.05 10*3/mm3   Immature Grans, Absolute 0.07 High  10*3/mm3   nRBC 0.0 /100 WBC           Glucose 90 mg/dL   BUN 11.0 mg/dL   Creatinine 0.55 Low  mg/dL   Sodium 139 mmol/L   Potassium 4.0 mmol/L   Chloride 106 mmol/L   CO2 20.5 Low  mmol/L   Calcium 9.0 mg/dL   Total Protein 6.6 g/dL   Albumin 3.6 g/dL   ALT (SGPT) 9 U/L   AST (SGOT) 13 U/L   Alkaline Phosphatase 91 U/L   Total Bilirubin 0.4 mg/dL   Globulin 3.0 gm/dL   A/G Ratio 1.2 g/dL   BUN/Creatinine Ratio 20.0    Anion Gap 12.5 mmol/L   eGFR 132.3 mL/min/1.73     Hemoglobin 8.8 Low  g/dL   Hematocrit 25.7 Low  %     Ultrasound without gestational sac or fetal pole.  Heterogenicity noted within cavity.    Assessment & Plan   Assessment / Plan     Brief Patient Summary:  Harleen Purdy is a 24 y.o. female with incomplete     Active Hospital Problems:  There are no active hospital problems to display for this patient.    Plan:   - Patient was given options of observation with misoprostol versus suction D&C.  Discussed risks of observation  including ongoing bleeding with need for emergent procedure if bleeding does not stop.  Discussed risks of D&C including infection and decreased future fertility.  After consideration, patient wants to proceed with D&C.  Will administer Doxycycline for surgical prophylaxis.  OR notified.  Discussed importance of quitting smoking/vaping at any level in postoperative/preconception period as this increases risks of complications and future pregnancy loss.  Patient with no other known etiology for demise after viability aside from possible aneuploidy.    VTE Prophylaxis:  No VTE prophylaxis order currently exists.        CODE STATUS:       Admission Status:  I believe this patient meets outpatient status.    John Nelson MD

## 2025-06-25 NOTE — TELEPHONE ENCOUNTER
Left voicemail for Harleen that Dr Rojas said you may or may not need the D&C scheduled for Friday. We can do an US tomorrow to make sure everything came out.   If completed, you do NOT need D&C. If bleeding excessive and not slowing down, could need to see ED about more urgent procedure. I am so sorry for your loss. Please call the office to schedule an US for tomorrow. My condolences.

## 2025-06-25 NOTE — PROGRESS NOTES
Clinical Pharmacy Services: Medication History    Harleen Purdy is a 24 y.o. female presenting to [facility] for   Chief Complaint   Patient presents with    Vaginal Bleeding       She  has no past medical history on file.    Allergies as of 06/25/2025    (No Known Allergies)       Medication information was obtained from: Patient   Pharmacy and Phone Number:     Prior to Admission Medications       Prescriptions Last Dose Informant Patient Reported? Taking?    Prenatal 28-0.8 MG tablet  Self No Yes    Take 1 tablet by mouth Daily. Please use formulary or generic, with DHA ideal              Medication notes:     This medication list is complete to the best of my knowledge as of 6/25/2025    Please call if questions.    Erica Herbert  Medication History Technician   [phone]    6/25/2025 16:37 EDT

## 2025-06-26 LAB
QT INTERVAL: 366 MS
QTC INTERVAL: 437 MS

## 2025-06-26 NOTE — ADDENDUM NOTE
Addendum  created 06/26/25 0802 by Morena Wise, CRNA    Child order released for a procedure order, Clinical Note Signed, Intraprocedure Blocks edited, LDA created via procedure documentation, SmartForm saved

## 2025-06-26 NOTE — ANESTHESIA PROCEDURE NOTES
Airway  Reason: elective    Date/Time: 6/25/2025 6:48 PM  Airway not difficult    General Information and Staff    Patient location during procedure: OR  Anesthesiologist: Tayler Mueller MD  CRNA/CAA: Morena Wise CRNA    Indications and Patient Condition  Indications for airway management: airway protection    Preoxygenated: yes    Mask difficulty assessment: 0 - not attempted    Final Airway Details    Final airway type: supraglottic airway      Successful airway: classic and LMA  Size: 3   Number of attempts at approach: 1  Assessment: lips, teeth, and gum same as pre-op and atraumatic intubation

## 2025-06-26 NOTE — ANESTHESIA POSTPROCEDURE EVALUATION
Patient: Harleen Purdy    Procedure Summary       Date: 06/25/25 Room / Location: [facility] OR  / [facility] MAIN OR    Anesthesia Start: 1841 Anesthesia Stop: 1925    Procedure: DILATATION AND CURETTAGE WITH SUCTION (Vagina) Diagnosis:     Surgeons: John Nelson MD Provider: Tayler Mueller MD    Anesthesia Type: general ASA Status: 2            Anesthesia Type: general    Vitals  Vitals Value Taken Time   /63 06/25/25 21:00   Temp 36.8 °C (98.2 °F) 06/25/25 21:00   Pulse 89 06/25/25 21:00   Resp 20 06/25/25 21:00   SpO2 99 % 06/25/25 21:00           Post Anesthesia Care and Evaluation    Patient location during evaluation: PACU  Patient participation: complete - patient participated  Level of consciousness: awake  Pain management: adequate    Airway patency: patent  Anesthetic complications: No anesthetic complications  PONV Status: controlled  Cardiovascular status: stable  Respiratory status: acceptable  Hydration status: acceptable

## 2025-06-27 LAB
CYTO UR: NORMAL
LAB AP CASE REPORT: NORMAL
PATH REPORT.FINAL DX SPEC: NORMAL
PATH REPORT.GROSS SPEC: NORMAL

## 2025-06-28 LAB
BH BB BLOOD EXPIRATION DATE: NORMAL
BH BB BLOOD TYPE BARCODE: 5100
BH BB DISPENSE STATUS: NORMAL
BH BB PRODUCT CODE: NORMAL
BH BB UNIT NUMBER: NORMAL
CROSSMATCH INTERPRETATION: NORMAL
UNIT  ABO: NORMAL
UNIT  RH: NORMAL

## 2025-07-10 ENCOUNTER — OFFICE VISIT (OUTPATIENT)
Dept: OBSTETRICS AND GYNECOLOGY | Facility: CLINIC | Age: 25
End: 2025-07-10
Payer: COMMERCIAL

## 2025-07-10 VITALS
HEART RATE: 98 BPM | WEIGHT: 129 LBS | SYSTOLIC BLOOD PRESSURE: 112 MMHG | BODY MASS INDEX: 20.25 KG/M2 | HEIGHT: 67 IN | DIASTOLIC BLOOD PRESSURE: 65 MMHG

## 2025-07-10 DIAGNOSIS — Z98.890 POST-OPERATIVE STATE: Primary | ICD-10-CM

## 2025-07-10 DIAGNOSIS — O02.1 IUFD AT LESS THAN 20 WEEKS OF GESTATION: ICD-10-CM

## 2025-07-10 NOTE — PROGRESS NOTES
"Subjective    is a 25 y.o. female here for Post op exam. Pt is s/p D&C for missed AB on 2025.     Chief Complaint   Patient presents with    Post-op        HPI    25 y.o.    S/P D&C for incomplete ab   Still with a little bleeding   No pain issues     Review of Systems   Constitutional:  Negative for fever.   Gastrointestinal:  Negative for abdominal pain.   Genitourinary:  Negative for pelvic pain.        Objective   /65   Pulse 98   Ht 170.2 cm (67\")   Wt 58.5 kg (129 lb)   Breastfeeding No   BMI 20.20 kg/m²   Physical Exam  Constitutional:       General: She is not in acute distress.     Appearance: Normal appearance. She is well-developed and normal weight.   Neck:      Thyroid: No thyromegaly.   Pulmonary:      Effort: No respiratory distress.   Abdominal:      General: Abdomen is flat. There is no distension.      Palpations: Abdomen is soft.      Tenderness: There is no abdominal tenderness.   Musculoskeletal:      Right lower leg: No edema.      Left lower leg: No edema.   Neurological:      Mental Status: She is alert and oriented to person, place, and time.   Skin:     General: Skin is warm and dry.   Psychiatric:         Behavior: Behavior normal.         Thought Content: Thought content normal.         Judgment: Judgment normal.   Vitals reviewed.          Assessment/Plan   Diagnoses and all orders for this visit:    1. Post-operative state (Primary)    2. IUFD at less than 20 weeks of gestation  -     Cancel: Antiphospholipid Syndrome  -     Cancel: Lupus Anticoagulant Panel  -     Anticardiolipin Antibody, IgG / M, Qn  -     Lupus Anticoagulant Panel  -     Beta-2 Glycoprotein I Antibody,G / M    - S/P Miscarriage at 13+ weeks   Uncertain etiology of IUFD  Checking APLAS as treatable with future pregnancies     - Post op D&C   Doing well   Released from restrictions       Plans to try again   Encouraged folic acid and wait until has at least one cycle to reset and clear out " everything     Follow up PRN and annual exam     Ray Rojas MD   7/10/2025  13:45 EDT

## 2025-07-21 LAB
APTT HEX PL PPP: 2 SEC
APTT IMM NP PPP: NORMAL SEC
APTT PPP 1:1 SALINE: NORMAL SEC
APTT PPP: 26.7 SEC
B2 GLYCOPROT1 IGA SER-ACNC: <10 SAU
B2 GLYCOPROT1 IGG SER-ACNC: <10 SGU
B2 GLYCOPROT1 IGG SER-ACNC: <9 GPI IGG UNITS (ref 0–20)
B2 GLYCOPROT1 IGM SER-ACNC: <10 SMU
B2 GLYCOPROT1 IGM SER-ACNC: <9 GPI IGM UNITS (ref 0–32)
CARDIOLIPIN IGG SER IA-ACNC: <10 GPL
CARDIOLIPIN IGG SER IA-ACNC: <9 GPL U/ML (ref 0–14)
CARDIOLIPIN IGM SER IA-ACNC: <10 MPL
CARDIOLIPIN IGM SER IA-ACNC: <9 MPL U/ML (ref 0–12)
CONFIRM APTT: 0.3 SEC
CONFIRM DRVVT: NORMAL SEC
DRVVT SCREEN TO CONFIRM RATIO: NORMAL RATIO
INR PPP: 1 RATIO
LABORATORY COMMENT REPORT: NORMAL
PROTHROMBIN TIME: 11.3 SEC
SCREEN DRVVT: 28.4 SEC
THROMBIN TIME: 20.1 SEC

## (undated) DEVICE — ST COL BERKELY TBG

## (undated) DEVICE — HOSE BT TO BT VAC CURETTAGE SNGL PT USE EA/10

## (undated) DEVICE — Device

## (undated) DEVICE — CANSTR SXN UTER NO FLTR SURG

## (undated) DEVICE — DRAPE,UNDERBUTTOCKS,PCH,STERILE: Brand: MEDLINE

## (undated) DEVICE — LOU D & C HYSTEROSCOPY: Brand: MEDLINE INDUSTRIES, INC.

## (undated) DEVICE — STRAP STIRUP SLP RNG 19X3.5IN DISP

## (undated) DEVICE — TBG W FLTR FOR BERKELEY SYSTEM

## (undated) DEVICE — GLV SURG BIOGEL LTX PF 7 1/2

## (undated) DEVICE — SACK GZ PERM